# Patient Record
Sex: FEMALE | Race: WHITE | HISPANIC OR LATINO | Employment: FULL TIME | URBAN - METROPOLITAN AREA
[De-identification: names, ages, dates, MRNs, and addresses within clinical notes are randomized per-mention and may not be internally consistent; named-entity substitution may affect disease eponyms.]

---

## 2017-05-05 ENCOUNTER — NON-PROVIDER VISIT (OUTPATIENT)
Dept: URGENT CARE | Facility: PHYSICIAN GROUP | Age: 52
End: 2017-05-05

## 2017-05-05 DIAGNOSIS — Z02.1 PRE-EMPLOYMENT DRUG SCREENING: ICD-10-CM

## 2017-05-05 LAB
AMP AMPHETAMINE: NORMAL
COC COCAINE: NORMAL
INT CON NEG: NEGATIVE
INT CON POS: POSITIVE
MET METHAMPHETAMINES: NORMAL
OPI OPIATES: NORMAL
PCP PHENCYCLIDINE: NORMAL
POC DRUG COMMENT 753798-OCCUPATIONAL HEALTH: NORMAL
THC: NORMAL

## 2017-05-05 PROCEDURE — 80305 DRUG TEST PRSMV DIR OPT OBS: CPT | Performed by: FAMILY MEDICINE

## 2018-09-07 ENCOUNTER — HOSPITAL ENCOUNTER (OUTPATIENT)
Dept: RADIOLOGY | Facility: MEDICAL CENTER | Age: 53
End: 2018-09-07

## 2018-09-10 ENCOUNTER — HOSPITAL ENCOUNTER (OUTPATIENT)
Dept: RADIOLOGY | Facility: MEDICAL CENTER | Age: 53
End: 2018-09-10
Attending: PHYSICIAN ASSISTANT
Payer: COMMERCIAL

## 2018-09-10 DIAGNOSIS — Z12.31 VISIT FOR SCREENING MAMMOGRAM: ICD-10-CM

## 2018-09-10 PROCEDURE — 77067 SCR MAMMO BI INCL CAD: CPT

## 2020-01-06 ENCOUNTER — OFFICE VISIT (OUTPATIENT)
Dept: URGENT CARE | Facility: PHYSICIAN GROUP | Age: 55
End: 2020-01-06
Payer: COMMERCIAL

## 2020-01-06 VITALS
OXYGEN SATURATION: 99 % | SYSTOLIC BLOOD PRESSURE: 120 MMHG | DIASTOLIC BLOOD PRESSURE: 70 MMHG | RESPIRATION RATE: 18 BRPM | HEART RATE: 74 BPM | BODY MASS INDEX: 29.49 KG/M2 | TEMPERATURE: 97.7 F | WEIGHT: 177.2 LBS

## 2020-01-06 DIAGNOSIS — J06.9 VIRAL URI WITH COUGH: ICD-10-CM

## 2020-01-06 PROCEDURE — 99203 OFFICE O/P NEW LOW 30 MIN: CPT | Performed by: NURSE PRACTITIONER

## 2020-01-06 RX ORDER — FLUTICASONE PROPIONATE 50 MCG
1 SPRAY, SUSPENSION (ML) NASAL DAILY
Qty: 16 G | Refills: 0 | Status: SHIPPED | OUTPATIENT
Start: 2020-01-06 | End: 2022-02-17

## 2020-01-06 RX ORDER — LISINOPRIL 2.5 MG/1
2.5 TABLET ORAL DAILY
COMMUNITY

## 2020-01-06 RX ORDER — ALBUTEROL SULFATE 90 UG/1
2 AEROSOL, METERED RESPIRATORY (INHALATION) EVERY 6 HOURS PRN
Qty: 8.5 G | Refills: 0 | Status: SHIPPED | OUTPATIENT
Start: 2020-01-06 | End: 2022-02-17

## 2020-01-06 RX ORDER — PIOGLITAZONEHYDROCHLORIDE 15 MG/1
15 TABLET ORAL DAILY
COMMUNITY
End: 2022-02-17

## 2020-01-06 RX ORDER — AZITHROMYCIN 250 MG/1
TABLET, FILM COATED ORAL
Qty: 6 TAB | Refills: 0 | Status: SHIPPED | OUTPATIENT
Start: 2020-01-06 | End: 2022-02-17

## 2020-01-06 RX ORDER — ATORVASTATIN CALCIUM 40 MG/1
40 TABLET, FILM COATED ORAL NIGHTLY
COMMUNITY

## 2020-01-06 ASSESSMENT — ENCOUNTER SYMPTOMS
HEARTBURN: 0
COUGH: 1
SHORTNESS OF BREATH: 0
DIZZINESS: 0
HEADACHES: 0
VOMITING: 0
SORE THROAT: 1
CHILLS: 0
ABDOMINAL PAIN: 0
FEVER: 0
NAUSEA: 0

## 2020-01-06 NOTE — PROGRESS NOTES
Subjective:      Consuelo Calabrese is a 54 y.o. female who presents with Cough (cough with phlegm x 6 days, chest congestion, chills,headache)            Cough   This is a new problem. Episode onset: 6 days. The problem has been gradually worsening. The cough is non-productive. Associated symptoms include chest pain, nasal congestion, postnasal drip and a sore throat. Pertinent negatives include no chills, fever, headaches, heartburn, rash or shortness of breath. Associated symptoms comments: Patient states she has had a cough with phlegm for 6 days and is now developing chest congestion chills and headache and states that it hurts when she breathes in, states she has now developed laryngitis today. Nothing aggravates the symptoms. Treatments tried: Nyquil. The treatment provided mild relief.       Review of Systems   Constitutional: Negative for chills and fever.   HENT: Positive for postnasal drip and sore throat.    Respiratory: Positive for cough. Negative for shortness of breath.    Cardiovascular: Positive for chest pain.   Gastrointestinal: Negative for abdominal pain, heartburn, nausea and vomiting.   Skin: Negative for itching and rash.   Neurological: Negative for dizziness and headaches.     Past Medical History:   Diagnosis Date   • Diabetes       Past Surgical History:   Procedure Laterality Date   • VAGINAL HYSTERECTOMY SCOPE TOTAL  12/16/2008    Performed by IS[facility]   • BLADDER SLING FEMALE  12/16/2008    Performed by ISAIAH MANTILLA at SURGERY SAME DAY [facility]S   • CYSTOSCOPY  12/16/2008    Performed by ISAIAH MANTILLA at [facility]      Social History     Socioeconomic History   • Marital status: Single     Spouse name: Not on file   • Number of children: Not on file   • Years of education: Not on file   • Highest education level: Not on file   Occupational History   • Not on file   Social Needs   • Financial resource  strain: Not on file   • Food insecurity:     Worry: Not on file     Inability: Not on file   • Transportation needs:     Medical: Not on file     Non-medical: Not on file   Tobacco Use   • Smoking status: Never Smoker   • Smokeless tobacco: Never Used   Substance and Sexual Activity   • Alcohol use: No   • Drug use: No   • Sexual activity: Not on file   Lifestyle   • Physical activity:     Days per week: Not on file     Minutes per session: Not on file   • Stress: Not on file   Relationships   • Social connections:     Talks on phone: Not on file     Gets together: Not on file     Attends Congregation service: Not on file     Active member of club or organization: Not on file     Attends meetings of clubs or organizations: Not on file     Relationship status: Not on file   • Intimate partner violence:     Fear of current or ex partner: Not on file     Emotionally abused: Not on file     Physically abused: Not on file     Forced sexual activity: Not on file   Other Topics Concern   • Not on file   Social History Narrative   • Not on file    Allergies: Sulfa drugs         Objective:     /70 (BP Location: Right arm, Patient Position: Sitting, BP Cuff Size: Adult)   Pulse 74   Temp 36.5 °C (97.7 °F) (Temporal)   Resp 18   Wt 80.4 kg (177 lb 3.2 oz)   SpO2 99%   BMI 29.49 kg/m²      Physical Exam  Vitals signs reviewed.   Constitutional:       Appearance: Normal appearance.   HENT:      Right Ear: Tympanic membrane, ear canal and external ear normal.      Left Ear: Tympanic membrane, ear canal and external ear normal.      Nose: Nose normal.      Mouth/Throat:      Mouth: Mucous membranes are moist.      Comments: Post nasal drip noted  Cardiovascular:      Rate and Rhythm: Normal rate and regular rhythm.      Heart sounds: Normal heart sounds.   Pulmonary:      Effort: Pulmonary effort is normal.      Breath sounds: Normal breath sounds.      Comments: Tight lung sounds throughout  Lymphadenopathy:       Cervical: Cervical adenopathy present.   Neurological:      General: No focal deficit present.      Mental Status: She is alert and oriented to person, place, and time.   Psychiatric:         Mood and Affect: Mood normal.         Behavior: Behavior normal.         Thought Content: Thought content normal.         Judgment: Judgment normal.                 Assessment/Plan:       1. Viral URI with cough  - albuterol 108 (90 Base) MCG/ACT Aero Soln inhalation aerosol; Inhale 2 Puffs by mouth every 6 hours as needed.  Dispense: 8.5 g; Refill: 0  - fluticasone (FLONASE ALLERGY RELIEF) 50 MCG/ACT nasal spray; Spray 1 Spray in nose every day.  Dispense: 16 g; Refill: 0  - azithromycin (ZITHROMAX) 250 MG Tab; Take two tablets on the first day and then one tablet for the next four days  Dispense: 6 Tab; Refill: 0  Contingent antibiotic prescription given to patient to fill upon meeting criteria of guidelines discussed.     Discussed physical examination findings are likely due to viral etiology.  Patient will start antibiotic on Wednesday if symptoms are not better.  Discussed him to Medicare including albuterol inhaler and using fluticasone as well as over-the-counter NSAIDs and Tylenol per 's instructions, increasing fluids, patient may continue to take NyQuil and Mucinex    Supportive care, differential diagnoses, and indications for immediate follow-up discussed with patient.    Pathogenesis of diagnosis discussed including typical length and natural progression. Patient expresses understanding and agrees to plan.    Instructed patient to return to clinic for worsening symptoms or symptoms that persist for 7 to 10 days       Please note that this dictation was created using voice recognition software. I have made every reasonable attempt to correct obvious errors, but I expect that there are errors of grammar and possibly content that I did not discover before finalizing the note.

## 2020-01-06 NOTE — LETTER
January 6, 2020         Patient: Consuelo Calabrese   YOB: 1965   Date of Visit: 1/6/2020           To Whom it May Concern:    Consuelo Calabrese was seen in my clinic on 1/6/2020. She may return to work on 01/09/2019    If you have any questions or concerns, please don't hesitate to call.        Sincerely,           JEFFERSON Britt.  Electronically Signed

## 2020-08-19 ENCOUNTER — APPOINTMENT (OUTPATIENT)
Dept: RADIOLOGY | Facility: IMAGING CENTER | Age: 55
End: 2020-08-19
Attending: PHYSICIAN ASSISTANT
Payer: COMMERCIAL

## 2020-08-19 ENCOUNTER — OFFICE VISIT (OUTPATIENT)
Dept: URGENT CARE | Facility: CLINIC | Age: 55
End: 2020-08-19
Payer: COMMERCIAL

## 2020-08-19 VITALS
HEART RATE: 68 BPM | TEMPERATURE: 98.3 F | DIASTOLIC BLOOD PRESSURE: 68 MMHG | OXYGEN SATURATION: 100 % | RESPIRATION RATE: 16 BRPM | SYSTOLIC BLOOD PRESSURE: 124 MMHG

## 2020-08-19 DIAGNOSIS — S92.302A CLOSED AVULSION FRACTURE OF METATARSAL BONE OF LEFT FOOT, INITIAL ENCOUNTER: ICD-10-CM

## 2020-08-19 DIAGNOSIS — M25.572 ACUTE LEFT ANKLE PAIN: ICD-10-CM

## 2020-08-19 DIAGNOSIS — S92.352A CLOSED NON-PHYSEAL FRACTURE OF FIFTH METATARSAL BONE OF LEFT FOOT, INITIAL ENCOUNTER: ICD-10-CM

## 2020-08-19 PROCEDURE — 99214 OFFICE O/P EST MOD 30 MIN: CPT | Performed by: PHYSICIAN ASSISTANT

## 2020-08-19 PROCEDURE — 73610 X-RAY EXAM OF ANKLE: CPT | Mod: TC,LT | Performed by: PHYSICIAN ASSISTANT

## 2020-08-19 PROCEDURE — 73630 X-RAY EXAM OF FOOT: CPT | Mod: TC,LT | Performed by: PHYSICIAN ASSISTANT

## 2020-08-19 ASSESSMENT — ENCOUNTER SYMPTOMS
CHILLS: 0
COUGH: 0
CONSTIPATION: 0
HEADACHES: 0
MYALGIAS: 0
DIARRHEA: 0
VOMITING: 0
EYE PAIN: 0
SORE THROAT: 0
FEVER: 0
ABDOMINAL PAIN: 0
SHORTNESS OF BREATH: 0
NAUSEA: 0

## 2020-08-19 NOTE — PROGRESS NOTES
Subjective:   Consuelo Calabrese is a 55 y.o. female who presents for Fall (she missed a step this morning)      This is a 55-year-old British-speaking female accompanied by her daughter acting as  who reports a fall around midnight last night where she missed the bottom step in a staircase.  Patient had her left leg extended and when it came down she felt immediate midfoot pain and pain on the lateral aspect of her left foot.  She had cataract corrective surgery this morning and did not think much about her foot however she continued to have pain and limp and they present for evaluation.  She has no other injuries or falls, no history of same.  She is a well-controlled diabetic she reports they have tried nonsteroidal anti-inflammatories and Tiger balm which has not really helped.      Review of Systems   Constitutional: Negative for chills and fever.   HENT: Negative for congestion, ear pain and sore throat.    Eyes: Negative for pain.   Respiratory: Negative for cough and shortness of breath.    Cardiovascular: Negative for chest pain.   Gastrointestinal: Negative for abdominal pain, constipation, diarrhea, nausea and vomiting.   Genitourinary: Negative for dysuria.   Musculoskeletal: Negative for myalgias.   Skin: Negative for rash.   Neurological: Negative for headaches.       Medications:    • albuterol Aers  • ASPIRIN 81 PO  • atorvastatin Tabs  • azithromycin Tabs  • Dulaglutide Sopn  • Empagliflozin Tabs  • fluticasone  • guaiFENesin ER Tb12  • insulin NPH Susp  • insulin regular Soln  • lisinopril Tabs  • metFORMIN Tabs  • pioglitazone Tabs    Allergies: Sulfa drugs    Problem List: Consuelo Calabrese has DKA (diabetic ketoacidoses) ([facility]); Sepsis (HCC); CAP (community acquired pneumonia); Increased anion gap metabolic acidosis; Diabetes mellitus, type 2 (HCC); and Lower urinary tract infectious disease on their problem list.    Surgical History:  Past Surgical History:   Procedure  Laterality Date   • VAGINAL HYSTERECTOMY SCOPE TOTAL  12/16/2008    Performed by ISAIAH MANTILLA at [facility]   • BLADDER SLING FEMALE  12/16/2008    Performed by ISAIAH MANTILLA at [facility]   • CYSTOSCOPY  12/16/2008    Performed by ISAIAH MANTILLA at [facility]       Past Social Hx: Consuelo Calabrese  reports that she has never smoked. She has never used smokeless tobacco. She reports that she does not drink alcohol or use drugs.     Past Family Hx:  Consuelo Calabrese family history is not on file.     Problem list, medications, and allergies reviewed by myself today in Epic.     Objective:     /68 (BP Location: Left arm, Patient Position: Sitting, BP Cuff Size: Adult)   Pulse 68   Temp 36.8 °C (98.3 °F) (Temporal)   Resp 16   SpO2 100%     Physical Exam  Vitals signs reviewed.   Constitutional:       Appearance: Normal appearance.   HENT:      Head: Normocephalic and atraumatic.      Right Ear: External ear normal.      Left Ear: External ear normal.      Nose: Nose normal.      Mouth/Throat:      Mouth: Mucous membranes are moist.   Eyes:      Conjunctiva/sclera: Conjunctivae normal.      Comments: Protective shield over left eye following surgery   Cardiovascular:      Rate and Rhythm: Normal rate.   Pulmonary:      Effort: Pulmonary effort is normal.   Musculoskeletal:      Comments: TTP over fifth metatarsal, no bony tenderness over the bony prominences of the ankle.  Flexion extension of actively and passively of the foot is intact however with pain.  Neurovascularly intact.  Brisk cap refill.   Skin:     General: Skin is warm and dry.      Capillary Refill: Capillary refill takes less than 2 seconds.   Neurological:      Mental Status: She is alert and oriented to person, place, and time.         RADIOLOGY RESULTS   Dx-ankle 3+ Views Left    Result Date: 8/19/2020 8/19/2020 12:20 PM HISTORY/REASON FOR EXAM:   Pain/Deformity Following Trauma. Fall today, pain and swelling TECHNIQUE/EXAM DESCRIPTION AND NUMBER OF VIEWS:  3 views of the LEFT ankle. COMPARISON: None. FINDINGS: Mildly displaced fracture at the fifth metatarsal base, possibly an avulsion injury. The visualized osseous structures are in anatomic alignment.  Ankle mortise is symmetric. The joint spaces are preserved. Bone mineralization may be decreased..     Mildly displaced fracture of the fifth metatarsal base. Prominent soft tissue swelling about the ankle.     Dx-foot-complete 3+ Left    Result Date: 8/19/2020 8/19/2020 12:20 PM HISTORY/REASON FOR EXAM:  Pain/Deformity Following Trauma TECHNIQUE/EXAM DESCRIPTION AND NUMBER OF VIEWS: 3 nonweightbearing views of the LEFT foot. COMPARISON:  None FINDINGS:  Avulsion fracture in the base of the fifth metatarsal extends to the fifth tarsometatarsal joint. There is overlying soft tissue edema. Enthesophytes project from the calcaneus. There are osteophytes projecting from the dorsal talonavicular and navicular cuneiform joints.     Avulsion fracture in the base of the fifth metatarsal.             Assessment/Plan:     Diagnosis and associated orders:     1. Acute left ankle pain  DX-ANKLE 3+ VIEWS LEFT    DX-FOOT-COMPLETE 3+ LEFT   2. Closed non-physeal fracture of fifth metatarsal bone of left foot, initial encounter  REFERRAL TO SPORTS MEDICINE   3. Closed avulsion fracture of metatarsal bone of left foot, initial encounter        Comments/MDM:     • The medical assistant placed the patient in a walking boot.  Due to the pseudo-Corley nature this needs to have repeat imaging done in the next 2 to 3 weeks to ensure that it is healing appropriately.  I offered crutches however they do not want a use crutches as they think it will make her more likely to fall however I instructed them to be weightbearing as tolerated.  I gave them specific dosage instructions for over-the-counter medications on told him to be wary  about using too much Motrin given her likely kidney disease and told her to take 2 and a milligrams of ibuprofen with breakfast lunch and dinner and she may take an additional Tylenol.  Or to stick to Tylenol monotherapy.  I told him to ice and elevate the ankle and follow-up with sports medicine           Differential diagnosis, natural history, supportive care, and indications for immediate follow-up discussed.    Advised the patient to follow-up with the primary care physician for recheck, reevaluation, and consideration of further management.    Please note that this dictation was created using voice recognition software. I have made a reasonable attempt to correct obvious errors, but I expect that there are errors of grammar and possibly content that I did not discover before finalizing the note.    This note was electronically signed by Vern Mims PA-C

## 2020-08-19 NOTE — LETTER
August 19, 2020    To Whom It May Concern:         This is confirmation that Consuelo Calabrese attended her scheduled appointment with Vern Mims P.A.-C. on 8/19/20.  This patient needs to remain in a walking boot until cleared by a specialist.  She may be able to work starting on August 24 but I told her if standing for prolonged periods causes pain she needs to remain off of work.         If you have any questions please do not hesitate to call me at the phone number listed below.    Sincerely,          Vern Mims P.A.-C.  [phone]

## 2020-08-30 SDOH — HEALTH STABILITY: PHYSICAL HEALTH: ON AVERAGE, HOW MANY DAYS PER WEEK DO YOU ENGAGE IN MODERATE TO STRENUOUS EXERCISE (LIKE A BRISK WALK)?: 0 DAYS

## 2020-08-30 SDOH — ECONOMIC STABILITY: HOUSING INSECURITY: IN THE LAST 12 MONTHS, HOW MANY PLACES HAVE YOU LIVED?: 1

## 2020-08-30 SDOH — ECONOMIC STABILITY: TRANSPORTATION INSECURITY
IN THE PAST 12 MONTHS, HAS LACK OF RELIABLE TRANSPORTATION KEPT YOU FROM MEDICAL APPOINTMENTS, MEETINGS, WORK OR FROM GETTING THINGS NEEDED FOR DAILY LIVING?: NO

## 2020-08-30 SDOH — ECONOMIC STABILITY: INCOME INSECURITY: IN THE LAST 12 MONTHS, WAS THERE A TIME WHEN YOU WERE NOT ABLE TO PAY THE MORTGAGE OR RENT ON TIME?: NO

## 2020-08-30 SDOH — ECONOMIC STABILITY: TRANSPORTATION INSECURITY
IN THE PAST 12 MONTHS, HAS THE LACK OF TRANSPORTATION KEPT YOU FROM MEDICAL APPOINTMENTS OR FROM GETTING MEDICATIONS?: NO

## 2020-08-30 SDOH — ECONOMIC STABILITY: HOUSING INSECURITY
IN THE LAST 12 MONTHS, WAS THERE A TIME WHEN YOU DID NOT HAVE A STEADY PLACE TO SLEEP OR SLEPT IN A SHELTER (INCLUDING NOW)?: NO

## 2020-08-30 SDOH — HEALTH STABILITY: MENTAL HEALTH
STRESS IS WHEN SOMEONE FEELS TENSE, NERVOUS, ANXIOUS, OR CAN'T SLEEP AT NIGHT BECAUSE THEIR MIND IS TROUBLED. HOW STRESSED ARE YOU?: NOT AT ALL

## 2020-08-30 SDOH — HEALTH STABILITY: PHYSICAL HEALTH: ON AVERAGE, HOW MANY MINUTES DO YOU ENGAGE IN EXERCISE AT THIS LEVEL?: 0 MINUTES

## 2020-08-30 ASSESSMENT — SOCIAL DETERMINANTS OF HEALTH (SDOH)
HOW OFTEN DO YOU GET TOGETHER WITH FRIENDS OR RELATIVES?: ONCE A WEEK
HOW MANY DRINKS CONTAINING ALCOHOL DO YOU HAVE ON A TYPICAL DAY WHEN YOU ARE DRINKING: DECLINE
HOW OFTEN DO YOU HAVE SIX OR MORE DRINKS ON ONE OCCASION: NEVER
WITHIN THE PAST 12 MONTHS, YOU WORRIED THAT YOUR FOOD WOULD RUN OUT BEFORE YOU GOT THE MONEY TO BUY MORE: NEVER TRUE
DO YOU BELONG TO ANY CLUBS OR ORGANIZATIONS SUCH AS CHURCH GROUPS UNIONS, FRATERNAL OR ATHLETIC GROUPS, OR SCHOOL GROUPS?: YES
IN A TYPICAL WEEK, HOW MANY TIMES DO YOU TALK ON THE PHONE WITH FAMILY, FRIENDS, OR NEIGHBORS?: MORE THAN THREE TIMES A WEEK
HOW OFTEN DO YOU ATTEND CHURCH OR RELIGIOUS SERVICES?: MORE THAN 4 TIMES PER YEAR
HOW OFTEN DO YOU ATTENT MEETINGS OF THE CLUB OR ORGANIZATION YOU BELONG TO?: MORE THAN 4 TIMES PER YEAR
WITHIN THE PAST 12 MONTHS, THE FOOD YOU BOUGHT JUST DIDN'T LAST AND YOU DIDN'T HAVE MONEY TO GET MORE: NEVER TRUE
HOW HARD IS IT FOR YOU TO PAY FOR THE VERY BASICS LIKE FOOD, HOUSING, MEDICAL CARE, AND HEATING?: SOMEWHAT HARD
HOW OFTEN DO YOU HAVE A DRINK CONTAINING ALCOHOL: NEVER

## 2020-08-31 ENCOUNTER — OFFICE VISIT (OUTPATIENT)
Dept: MEDICAL GROUP | Facility: CLINIC | Age: 55
End: 2020-08-31
Payer: COMMERCIAL

## 2020-08-31 VITALS
OXYGEN SATURATION: 96 % | RESPIRATION RATE: 18 BRPM | SYSTOLIC BLOOD PRESSURE: 132 MMHG | DIASTOLIC BLOOD PRESSURE: 80 MMHG | TEMPERATURE: 98.4 F | HEIGHT: 65 IN | HEART RATE: 86 BPM | WEIGHT: 177.2 LBS | BODY MASS INDEX: 29.52 KG/M2

## 2020-08-31 DIAGNOSIS — S92.353A: ICD-10-CM

## 2020-08-31 PROCEDURE — 28470 CLTX METATARSAL FX WO MNP EA: CPT | Mod: LT | Performed by: FAMILY MEDICINE

## 2020-08-31 ASSESSMENT — ENCOUNTER SYMPTOMS
NAUSEA: 0
CHILLS: 0
VOMITING: 0
DIZZINESS: 0
SHORTNESS OF BREATH: 0
FEVER: 0

## 2020-08-31 NOTE — LETTER
August 31, 2020         Patient: Consuelo Calabrese   YOB: 1965   Date of Visit: 8/31/2020           To Whom it May Concern:    Consuelo Calabrese was seen in my clinic on 8/31/2020. She may return to work, but she CANNOT stand or walk unless for commuting and she needs to wear her orthopedic boot at all times.  She will likely require some sort of restriction for 8 to 10 weeks.    If you have any questions or concerns, please don't hesitate to call.        Sincerely,           Chidi Connolly M.D.  Electronically Signed

## 2020-08-31 NOTE — PROGRESS NOTES
Subjective:      Consuelo Calabrese is a 55 y.o. female who presents with Foot Injury (Referral from UC/ L foot injury )     Referred by Vern Mims PA-C for evaluation of LEFT foot pain    HPI   LEFT foot pain  Date of injury, August 18, 2020  Mechanism of injury, inversion while coming downstairs after inadvertently missing a step due to pending eye surgery  No notable pop at the time of injury  Pain is predominantly along the LEFT lateral foot and plantar aspect of the foot  Burning pain  Some radiation to the despite the distal aspect of the LEFT foot  She does feel somewhat more protected in CAM Walker boot, but it is difficult for her to ambulate with  POSITIVE night symptoms  Swelling has been fluctuating  Naproxen and Tylenol for pain which helps some  There is positive prior history of foot fracture, unclear which foot it was    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   Respiratory: Negative for shortness of breath.    Cardiovascular: Negative for chest pain.   Gastrointestinal: Negative for nausea and vomiting.   Neurological: Negative for dizziness.     PMH:  has a past medical history of Diabetes.  MEDS:   Current Outpatient Medications:   •  Empagliflozin (JARDIANCE) 25 MG Tab, Take  by mouth., Disp: , Rfl:   •  atorvastatin (LIPITOR) 40 MG Tab, Take 40 mg by mouth every evening., Disp: , Rfl:   •  ASPIRIN 81 PO, Take  by mouth., Disp: , Rfl:   •  lisinopril (PRINIVIL) 2.5 MG Tab, Take 2.5 mg by mouth every day., Disp: , Rfl:   •  pioglitazone (ACTOS) 15 MG Tab, Take 15 mg by mouth every day., Disp: , Rfl:   •  Dulaglutide (TRULICITY) 1.5 MG/0.5ML Solution Pen-injector, Inject  as instructed., Disp: , Rfl:   •  albuterol 108 (90 Base) MCG/ACT Aero Soln inhalation aerosol, Inhale 2 Puffs by mouth every 6 hours as needed., Disp: 8.5 g, Rfl: 0  •  fluticasone (FLONASE ALLERGY RELIEF) 50 MCG/ACT  "nasal spray, Spray 1 Spray in nose every day., Disp: 16 g, Rfl: 0  •  azithromycin (ZITHROMAX) 250 MG Tab, Take two tablets on the first day and then one tablet for the next four days, Disp: 6 Tab, Rfl: 0  •  insulin NPH (HUMULIN,NOVOLIN) 100 UNIT/ML SUSP, Inject 20 Units as instructed 2 Times a Day., Disp: 10 mL, Rfl: 1  •  insulin regular (HUMULIN R) 100 Unit/mL SOLN, Inject 0-6 Units as instructed 3 times a day, with meals. Blood sugar before meals TID  >150: 2units with food  200-250: 4 units with food 250-300: 6 units with food >300: 8 units with food and call your doctor, Disp: 10 mL, Rfl: 1  •  metformin (GLUCOPHAGE) 500 MG TABS, Take 1 Tab by mouth 2 times a day, with meals., Disp: 60 Tab, Rfl: 1  •  guaifenesin LA (MUCINEX) 600 MG TB12, Take 1 Tab by mouth every 12 hours as needed for Cough. (Patient not taking: Reported on 1/6/2020), Disp: 28 Tab, Rfl: 0  ALLERGIES:   Allergies   Allergen Reactions   • Sulfa Drugs Anaphylaxis     SURGHX:   Past Surgical History:   Procedure Laterality Date   • VAGINAL HYSTERECTOMY SCOPE TOTAL  12/16/2008    Performed by ISAIAH MANTILLA [facility]   • BLADDER SLING FEMALE  12/16/2008    Performed by ISAIAH MANTILLA at [facility]   • CYSTOSCOPY  12/16/2008    Performed by ISAIAH MANTILLA at [facility]     SOCHX:  reports that she has never smoked. She has never used smokeless tobacco. She reports that she does not drink alcohol or use drugs.  FH: Family history was reviewed, no pertinent findings to report     Objective:     /80 (BP Location: Left arm, Patient Position: Sitting, BP Cuff Size: Adult)   Pulse 86   Temp 36.9 °C (98.4 °F) (Temporal)   Resp 18   Ht 1.651 m (5' 5\")   Wt 80.4 kg (177 lb 3.2 oz)   SpO2 96%   BMI 29.49 kg/m²      Physical Exam       RIGHT ANKLE:  There is NO swelling noted at the ankle  Range of motion intact with dorsiflexion and plantarflexion, inversion and " eversion  There is NO tenderness of the ATFL, CF or PTF ligament  There is NO tenderness of the lateral malleolus or medial malleolus  Anterior drawer testing is NEGATIVE  Talar tilt testing is NEGATIVE  The foot and ankle is otherwise neurovascularly intact    RIGHT FOOT:  There is NO swelling noted at the foot  There is NO tenderness at the base of the fifth metatarsal, cuboid, or tarsal navicular  There is NO pain with metatarsal squeeze test    LEFT ANKLE:  There is MILD swelling noted at the LATERAL ankle  Range of motion intact with dorsiflexion and plantarflexion, inversion and eversion  There is NO tenderness of the ATFL, CF or PTF ligament  There is NO tenderness of the lateral malleolus or medial malleolus  Anterior drawer testing is NEGATIVE  Talar tilt testing is NEGATIVE  The foot and ankle is otherwise neurovascularly intact    LEFT FOOT:  There is POSITIVE swelling with ecchymosis noted at the foot  There is POSITIVE tenderness at the base of the fifth metatarsal, with minimal tenderness of the cuboid, and NO tenderness of the tarsal navicular  There is NO pain with metatarsal squeeze test    NEUTRAL stance  Able to ambulate with ANTALGIC gait with CAM Walker boot and cane for ambulation     Assessment/Plan:        1. Closed displaced fracture of fifth metatarsal bone, initial encounter       Date of injury, August 18, 2020  Mechanism of injury, inversion while coming downstairs after inadvertently missing a step due to pending eye surgery    Fracture stabilized in short cam walker boot at urgent care  The plan is to continue fracture stabilization for a total of 6 weeks from the date of injury (until on or after September 29, 2020)    She is provided with a work note that she cannot walk and she should use her cam walker boot until reevaluation in 2 weeks  She did bring some disability paperwork which we will gladly complete for her    Return in about 2 weeks (around 9/14/2020).  For fracture  check        8/19/2020 12:20 PM     HISTORY/REASON FOR EXAM:  Pain/Deformity Following Trauma        TECHNIQUE/EXAM DESCRIPTION AND NUMBER OF VIEWS:  3 nonweightbearing views of the LEFT foot.     COMPARISON:  None     FINDINGS:  Avulsion fracture in the base of the fifth metatarsal extends to the fifth tarsometatarsal joint. There is overlying soft tissue edema. Enthesophytes project from the calcaneus. There are osteophytes projecting from the dorsal talonavicular   and navicular cuneiform joints.     IMPRESSION:     Avulsion fracture in the base of the fifth metatarsal.            8/19/2020 12:20 PM     HISTORY/REASON FOR EXAM:  Pain/Deformity Following Trauma.  Fall today, pain and swelling     TECHNIQUE/EXAM DESCRIPTION AND NUMBER OF VIEWS:  3 views of the LEFT ankle.     COMPARISON: None.     FINDINGS:     Mildly displaced fracture at the fifth metatarsal base, possibly an avulsion injury.  The visualized osseous structures are in anatomic alignment.  Ankle mortise is symmetric.  The joint spaces are preserved.  Bone mineralization may be decreased..        IMPRESSION:     Mildly displaced fracture of the fifth metatarsal base.     Prominent soft tissue swelling about the ankle.     Thank you Vern Mims PA-C for allowing me to participate in care of your patient.      Disability forms completed and will be scanned.

## 2020-09-14 ENCOUNTER — OFFICE VISIT (OUTPATIENT)
Dept: MEDICAL GROUP | Facility: CLINIC | Age: 55
End: 2020-09-14
Payer: COMMERCIAL

## 2020-09-14 VITALS
DIASTOLIC BLOOD PRESSURE: 76 MMHG | WEIGHT: 177.2 LBS | SYSTOLIC BLOOD PRESSURE: 118 MMHG | RESPIRATION RATE: 16 BRPM | BODY MASS INDEX: 29.52 KG/M2 | HEART RATE: 82 BPM | TEMPERATURE: 98.6 F | HEIGHT: 65 IN | OXYGEN SATURATION: 97 %

## 2020-09-14 DIAGNOSIS — S92.353D: ICD-10-CM

## 2020-09-14 PROCEDURE — 99024 POSTOP FOLLOW-UP VISIT: CPT | Performed by: FAMILY MEDICINE

## 2020-09-14 NOTE — PROGRESS NOTES
"Subjective:      Consuelo Calabrese is a 55 y.o. 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 [employer] items  She likes to walk     Objective:     /76 (BP Location: Left arm, Patient Position: Sitting, BP Cuff Size: Adult)   Pulse 82   Temp 37 °C (98.6 °F) (Temporal)   Resp 16   Ht 1.651 m (5' 5\")   Wt 80.4 kg (177 lb 3.2 oz)   SpO2 97%   BMI 29.49 kg/m²     Physical Exam    LEFT FOOT:  There is MINIMAL swelling noted at the foot  There is MINIMAL tenderness at the base of the fifth metatarsal, and no tenderness of the cuboid, and NO tenderness of the tarsal navicular  There is NO pain with metatarsal squeeze test    NEUTRAL stance  Able to ambulate with NORMAL gait with CAM Walker boot     Assessment/Plan:        1. Closed displaced fracture of fifth metatarsal bone with routine healing, subsequent encounter       Date of injury, August 18, 2020  Mechanism of injury, inversion while coming downstairs after inadvertently missing a step due to pending eye surgery    Fracture stabilized in short cam walker boot at urgent care  Continue fracture stabilization for a total of 6 weeks from the date of injury (until on or after September 29, 2020)    Return in about 3 weeks (around 10/5/2020).  To see how she is doing OUT of her cam walker boot        8/19/2020 12:20 PM     HISTORY/REASON FOR EXAM:  Pain/Deformity Following Trauma        TECHNIQUE/EXAM DESCRIPTION AND NUMBER OF VIEWS:  3 nonweightbearing views of the LEFT foot.     COMPARISON:  None     FINDINGS:  Avulsion fracture in the base of the fifth metatarsal " extends to the fifth tarsometatarsal joint. There is overlying soft tissue edema. Enthesophytes project from the calcaneus. There are osteophytes projecting from the dorsal talonavicular   and navicular cuneiform joints.     IMPRESSION:     Avulsion fracture in the base of the fifth metatarsal.          8/19/2020 12:20 PM     HISTORY/REASON FOR EXAM:  Pain/Deformity Following Trauma.  Fall today, pain and swelling     TECHNIQUE/EXAM DESCRIPTION AND NUMBER OF VIEWS:  3 views of the LEFT ankle.     COMPARISON: None.     FINDINGS:     Mildly displaced fracture at the fifth metatarsal base, possibly an avulsion injury.  The visualized osseous structures are in anatomic alignment.  Ankle mortise is symmetric.  The joint spaces are preserved.  Bone mineralization may be decreased..        IMPRESSION:     Mildly displaced fracture of the fifth metatarsal base.     Prominent soft tissue swelling about the ankle.     Thank you Vern Mims PA-C for allowing me to participate in care of your patient.

## 2020-10-05 ENCOUNTER — OFFICE VISIT (OUTPATIENT)
Dept: MEDICAL GROUP | Facility: CLINIC | Age: 55
End: 2020-10-05
Payer: COMMERCIAL

## 2020-10-05 ENCOUNTER — APPOINTMENT (OUTPATIENT)
Dept: RADIOLOGY | Facility: IMAGING CENTER | Age: 55
End: 2020-10-05
Attending: FAMILY MEDICINE
Payer: COMMERCIAL

## 2020-10-05 VITALS
DIASTOLIC BLOOD PRESSURE: 80 MMHG | OXYGEN SATURATION: 95 % | HEART RATE: 86 BPM | BODY MASS INDEX: 29.52 KG/M2 | RESPIRATION RATE: 18 BRPM | SYSTOLIC BLOOD PRESSURE: 122 MMHG | TEMPERATURE: 98.3 F | WEIGHT: 177.2 LBS | HEIGHT: 65 IN

## 2020-10-05 DIAGNOSIS — S92.353D: ICD-10-CM

## 2020-10-05 PROCEDURE — 99024 POSTOP FOLLOW-UP VISIT: CPT | Performed by: FAMILY MEDICINE

## 2020-10-05 PROCEDURE — 73630 X-RAY EXAM OF FOOT: CPT | Mod: TC,LT | Performed by: FAMILY MEDICINE

## 2020-10-05 NOTE — PROGRESS NOTES
"Subjective:      Consuelo Calabrese is a 55 y.o. 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 [employer] items  She likes to walk     Objective:     /80 (BP Location: Left arm, Patient Position: Sitting, BP Cuff Size: Adult)   Pulse 86   Temp 36.8 °C (98.3 °F) (Temporal)   Resp 18   Ht 1.651 m (5' 5\")   Wt 80.4 kg (177 lb 3.2 oz)   SpO2 95%   BMI 29.49 kg/m²       Physical Exam    LEFT FOOT:  There is NO swelling noted at the foot  There is MINIMAL tenderness at the base of the fifth metatarsal, and no tenderness of the cuboid, and NO tenderness of the tarsal navicular  There is NO pain with metatarsal squeeze test    NEUTRAL stance  Able to ambulate with NORMAL gait with CAM Walker boot     Assessment/Plan:      1. Closed displaced fracture of fifth metatarsal bone with routine healing, subsequent encounter  DX-FOOT-COMPLETE 3+ LEFT     Date of injury, August 18, 2020  Mechanism of injury, inversion while coming downstairs after inadvertently missing a step due to pending eye surgery    Fracture stabilized in short cam walker boot at urgent care  Continue fracture stabilization for a total of 6 weeks from the date of injury (until on or after September 29, 2020)    Return in about 1 week (around 10/12/2020).  To see how she is doing weaning out of her cam walker boot  The plan is to get her out of the boot at that visit    10/5/2020 1:19 PM     HISTORY/REASON FOR EXAM:  Pain/Deformity Following Trauma; continued fracture site pain, verify healing and stability     TECHNIQUE/EXAM DESCRIPTION AND NUMBER OF " VIEWS:  3 views of the LEFT foot.     COMPARISON:  8/19/2020     FINDINGS:  Fracture again seen at the base of the 5th metatarsal involving the articular surface.  Alignment is similar to prior exam.  Margins are ill-defined.  No bridging callus demonstrated.  Diffuse osteopenia.  Degenerative change of the midfoot.  Calcaneal enthesophytes present.     IMPRESSION:     Subacute intra-articular fracture of the base of LEFT 5th metatarsal without evidence for bridging callus.  Alignment is unchanged.              8/19/2020 12:20 PM     HISTORY/REASON FOR EXAM:  Pain/Deformity Following Trauma        TECHNIQUE/EXAM DESCRIPTION AND NUMBER OF VIEWS:  3 nonweightbearing views of the LEFT foot.     COMPARISON:  None     FINDINGS:  Avulsion fracture in the base of the fifth metatarsal extends to the fifth tarsometatarsal joint. There is overlying soft tissue edema. Enthesophytes project from the calcaneus. There are osteophytes projecting from the dorsal talonavicular   and navicular cuneiform joints.     IMPRESSION:     Avulsion fracture in the base of the fifth metatarsal.          8/19/2020 12:20 PM     HISTORY/REASON FOR EXAM:  Pain/Deformity Following Trauma.  Fall today, pain and swelling     TECHNIQUE/EXAM DESCRIPTION AND NUMBER OF VIEWS:  3 views of the LEFT ankle.     COMPARISON: None.     FINDINGS:     Mildly displaced fracture at the fifth metatarsal base, possibly an avulsion injury.  The visualized osseous structures are in anatomic alignment.  Ankle mortise is symmetric.  The joint spaces are preserved.  Bone mineralization may be decreased..        IMPRESSION:     Mildly displaced fracture of the fifth metatarsal base.     Prominent soft tissue swelling about the ankle.     Thank you Vern Mims PA-C for allowing me to participate in care of your patient.

## 2020-10-12 ENCOUNTER — OFFICE VISIT (OUTPATIENT)
Dept: MEDICAL GROUP | Facility: CLINIC | Age: 55
End: 2020-10-12
Payer: COMMERCIAL

## 2020-10-12 ENCOUNTER — APPOINTMENT (OUTPATIENT)
Dept: RADIOLOGY | Facility: IMAGING CENTER | Age: 55
End: 2020-10-12
Attending: FAMILY MEDICINE
Payer: COMMERCIAL

## 2020-10-12 VITALS
DIASTOLIC BLOOD PRESSURE: 80 MMHG | HEIGHT: 65 IN | TEMPERATURE: 98.7 F | BODY MASS INDEX: 29.52 KG/M2 | SYSTOLIC BLOOD PRESSURE: 118 MMHG | RESPIRATION RATE: 16 BRPM | WEIGHT: 177.2 LBS | OXYGEN SATURATION: 97 % | HEART RATE: 76 BPM

## 2020-10-12 DIAGNOSIS — S92.353D: ICD-10-CM

## 2020-10-12 PROCEDURE — 99024 POSTOP FOLLOW-UP VISIT: CPT | Performed by: FAMILY MEDICINE

## 2020-10-12 PROCEDURE — 73630 X-RAY EXAM OF FOOT: CPT | Mod: TC,LT | Performed by: FAMILY MEDICINE

## 2020-10-12 NOTE — PROGRESS NOTES
"Subjective:      Consuelo Calabrese is a 55 y.o. female who presents with Foot Injury (Referral from UC/ L foot injury )     Referred by Vern Mims PA-C for evaluation of LEFT foot pain    HPI   LEFT foot pain  Date of injury, August 18, 2020  Mechanism of injury, inversion while coming downstairs after inadvertently missing a step due to pending eye surgery  Unfortunately, she sustained a repeat injury the day after her last visit (on August 6, 2020), she has had significant fracture site pain since then.    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 [employer] items  She likes to walk     Objective:     /80 (BP Location: Right arm, Patient Position: Sitting, BP Cuff Size: Adult)   Pulse 76   Temp 37.1 °C (98.7 °F) (Temporal)   Resp 16   Ht 1.651 m (5' 5\")   Wt 80.4 kg (177 lb 3.2 oz)   SpO2 97%   BMI 29.49 kg/m²     Physical Exam    LEFT FOOT:  There is NO swelling noted at the foot  There is POSITIVE tenderness at the base of the fifth metatarsal, and no tenderness of the cuboid, and NO tenderness of the tarsal navicular  There is NO pain with metatarsal squeeze test    NEUTRAL stance  Able to ambulate with mildly antalgic gait with CAM Walker boot     Assessment/Plan:      1. Closed displaced fracture of fifth metatarsal bone with routine healing, subsequent encounter  DX-FOOT-COMPLETE 3+ LEFT    REFERRAL TO ORTHOPEDICS     Date of injury, August 18, 2020  Mechanism of injury, inversion while coming downstairs after inadvertently missing a step due to pending eye surgery    Fracture stabilized in short cam walker boot at urgent care   The original plan was to continue fracture stabilization for a total of 6 weeks from the date of injury (until on or after September 29, 2020)    Unfortunately, she sustained a REPEAT injury on October 6, 2020  There is no much difference in her repeat films    Referral for orthopedic foot and ankle evaluation since her initial injury was 8 weeks ago and she now " sustained a REPEAT injury causing recurrence/worsening of her pain      TECHNIQUE/EXAM DESCRIPTION AND NUMBER OF VIEWS:  3 views of the LEFT foot.     COMPARISON:  10/5/2020     FINDINGS:     There is no focal soft tissue swelling.     The subacute intra-articular fracture at the base of the left 5th metatarsal is without significant interval change.     The alignment is maintained.     There are no new fractures.     The remainder of the foot is unchanged.     IMPRESSION:     1.  There is no change in the appearance of the subacute intra-articular fracture at the base of the left 5th metatarsal.      10/5/2020 1:19 PM     HISTORY/REASON FOR EXAM:  Pain/Deformity Following Trauma; continued fracture site pain, verify healing and stability     TECHNIQUE/EXAM DESCRIPTION AND NUMBER OF VIEWS:  3 views of the LEFT foot.     COMPARISON:  8/19/2020     FINDINGS:  Fracture again seen at the base of the 5th metatarsal involving the articular surface.  Alignment is similar to prior exam.  Margins are ill-defined.  No bridging callus demonstrated.  Diffuse osteopenia.  Degenerative change of the midfoot.  Calcaneal enthesophytes present.     IMPRESSION:     Subacute intra-articular fracture of the base of LEFT 5th metatarsal without evidence for bridging callus.  Alignment is unchanged.              8/19/2020 12:20 PM     HISTORY/REASON FOR EXAM:  Pain/Deformity Following Trauma        TECHNIQUE/EXAM DESCRIPTION AND NUMBER OF VIEWS:  3 nonweightbearing views of the LEFT foot.     COMPARISON:  None     FINDINGS:  Avulsion fracture in the base of the fifth metatarsal extends to the fifth tarsometatarsal joint. There is overlying soft tissue edema. Enthesophytes project from the calcaneus. There are osteophytes projecting from the dorsal talonavicular   and navicular cuneiform joints.     IMPRESSION:     Avulsion fracture in the base of the fifth metatarsal.          8/19/2020 12:20 PM     HISTORY/REASON FOR EXAM:   Pain/Deformity Following Trauma.  Fall today, pain and swelling     TECHNIQUE/EXAM DESCRIPTION AND NUMBER OF VIEWS:  3 views of the LEFT ankle.     COMPARISON: None.     FINDINGS:     Mildly displaced fracture at the fifth metatarsal base, possibly an avulsion injury.  The visualized osseous structures are in anatomic alignment.  Ankle mortise is symmetric.  The joint spaces are preserved.  Bone mineralization may be decreased..        IMPRESSION:     Mildly displaced fracture of the fifth metatarsal base.      Thank you Vern Mims PA-C for allowing me to participate in care of your patient.

## 2020-10-20 ENCOUNTER — OFFICE VISIT (OUTPATIENT)
Dept: URGENT CARE | Facility: PHYSICIAN GROUP | Age: 55
End: 2020-10-20
Payer: COMMERCIAL

## 2020-10-20 VITALS
HEIGHT: 65 IN | SYSTOLIC BLOOD PRESSURE: 122 MMHG | WEIGHT: 177 LBS | TEMPERATURE: 98.4 F | DIASTOLIC BLOOD PRESSURE: 84 MMHG | RESPIRATION RATE: 18 BRPM | BODY MASS INDEX: 29.49 KG/M2 | OXYGEN SATURATION: 97 % | HEART RATE: 88 BPM

## 2020-10-20 DIAGNOSIS — L29.9 PRURITUS: ICD-10-CM

## 2020-10-20 DIAGNOSIS — H93.8X1 EAR SWELLING, RIGHT: ICD-10-CM

## 2020-10-20 DIAGNOSIS — R21 RASH AND NONSPECIFIC SKIN ERUPTION: ICD-10-CM

## 2020-10-20 DIAGNOSIS — H60.11 CELLULITIS OF AURICLE OF RIGHT EAR: ICD-10-CM

## 2020-10-20 PROCEDURE — 99214 OFFICE O/P EST MOD 30 MIN: CPT | Performed by: NURSE PRACTITIONER

## 2020-10-20 RX ORDER — DIPHENHYDRAMINE HCL 50 MG
50 CAPSULE ORAL EVERY 6 HOURS PRN
Qty: 30 CAP | Refills: 0 | Status: SHIPPED | OUTPATIENT
Start: 2020-10-20

## 2020-10-20 RX ORDER — AMOXICILLIN 500 MG/1
500 CAPSULE ORAL 3 TIMES DAILY
Qty: 15 CAP | Refills: 0 | Status: SHIPPED | OUTPATIENT
Start: 2020-10-20 | End: 2020-10-25

## 2020-10-20 ASSESSMENT — ENCOUNTER SYMPTOMS
CARDIOVASCULAR NEGATIVE: 1
CONSTITUTIONAL NEGATIVE: 1
FEVER: 0
RESPIRATORY NEGATIVE: 1

## 2020-10-20 ASSESSMENT — VISUAL ACUITY: OU: 1

## 2020-10-20 NOTE — PROGRESS NOTES
Subjective:     Consuelo Calabrese is a 55 y.o. female who presents for Ear Pain (R ear pain, swelling, burning, pt states she woke up this morning with it )       Otalgia   There is pain in the right ear. This is a new problem. The problem has been gradually worsening. The pain is mild. Associated symptoms include a rash.      Patient reports that today she started to experience redness, swelling, and warmth of her right external ear.  Also reports itchiness.  Also noticing a small rash on both of her arms which is also a little red and itchy.  Had no symptoms yesterday.  Reports she was recently in contact with a cat but reports she has been with the same cat before and has not had problems.  She also reports that she was handling a tomato plant recently.  Prior therapy: None.  Denies fever or other symptoms.    Patient was screened prior to rooming and denied COVID-19 diagnosis or contact with a person who has been diagnosed or is suspected to have COVID-19. During this visit, appropriate PPE was worn, hand hygiene was performed, and the patient and any visitors were masked.    PMH:  has a past medical history of Diabetes.    MEDS:   Current Outpatient Medications:   •  diphenhydrAMINE (BENADRYL) 50 MG Cap, Take 1 Cap by mouth every 6 hours as needed for Itching or Rash., Disp: 30 Cap, Rfl: 0  •  amoxicillin (AMOXIL) 500 MG Cap, Take 1 Cap by mouth 3 times a day for 5 days., Disp: 15 Cap, Rfl: 0  •  Empagliflozin (JARDIANCE) 25 MG Tab, Take  by mouth., Disp: , Rfl:   •  atorvastatin (LIPITOR) 40 MG Tab, Take 40 mg by mouth every evening., Disp: , Rfl:   •  ASPIRIN 81 PO, Take  by mouth., Disp: , Rfl:   •  lisinopril (PRINIVIL) 2.5 MG Tab, Take 2.5 mg by mouth every day., Disp: , Rfl:   •  Dulaglutide (TRULICITY) 1.5 MG/0.5ML Solution Pen-injector, Inject  as instructed., Disp: , Rfl:   •  metformin (GLUCOPHAGE) 500 MG TABS, Take 1 Tab by mouth 2 times a day, with meals., Disp: 60 Tab, Rfl: 1  •  pioglitazone  (ACTOS) 15 MG Tab, Take 15 mg by mouth every day., Disp: , Rfl:   •  albuterol 108 (90 Base) MCG/ACT Aero Soln inhalation aerosol, Inhale 2 Puffs by mouth every 6 hours as needed., Disp: 8.5 g, Rfl: 0  •  fluticasone (FLONASE ALLERGY RELIEF) 50 MCG/ACT nasal spray, Spray 1 Spray in nose every day., Disp: 16 g, Rfl: 0  •  azithromycin (ZITHROMAX) 250 MG Tab, Take two tablets on the first day and then one tablet for the next four days, Disp: 6 Tab, Rfl: 0  •  insulin NPH (HUMULIN,NOVOLIN) 100 UNIT/ML SUSP, Inject 20 Units as instructed 2 Times a Day., Disp: 10 mL, Rfl: 1  •  insulin regular (HUMULIN R) 100 Unit/mL SOLN, Inject 0-6 Units as instructed 3 times a day, with meals. Blood sugar before meals TID  >150: 2units with food  200-250: 4 units with food 250-300: 6 units with food >300: 8 units with food and call your doctor, Disp: 10 mL, Rfl: 1  •  guaifenesin LA (MUCINEX) 600 MG TB12, Take 1 Tab by mouth every 12 hours as needed for Cough. (Patient not taking: Reported on 1/6/2020), Disp: 28 Tab, Rfl: 0    ALLERGIES:   Allergies   Allergen Reactions   • Sulfa Drugs Anaphylaxis     SURGHX:   Past Surgical History:   Procedure Laterality Date   • VAGINAL HYSTERECTOMY SCOPE TOTAL  12/16/2008    [facility]   • BLADDER SLING FEMALE  12/16/2008    Performed by ISAIAH MANTILLA at SURGERY SAME DAY [facility] ORS   • CYSTOSCOPY  12/16/2008    Performed by ISAIAH MANTILLA at SURGERY SAME DAY [facility] ORS     SOCHX:  reports that she has never smoked. She has never used smokeless tobacco. She reports that she does not drink alcohol or use drugs.     FH: Reviewed with patient, not pertinent to this visit.    Review of Systems   Constitutional: Negative.  Negative for fever and malaise/fatigue.   HENT: Positive for ear pain.    Respiratory: Negative.    Cardiovascular: Negative.    Skin: Positive for itching and rash.   All other systems reviewed and are  "negative.    Additional details per HPI.      Objective:     /84 (BP Location: Left arm, Patient Position: Sitting, BP Cuff Size: Adult)   Pulse 88   Temp 36.9 °C (98.4 °F) (Temporal)   Resp 18   Ht 1.651 m (5' 5\")   Wt 80.3 kg (177 lb)   SpO2 97%   BMI 29.45 kg/m²     Physical Exam  Vitals signs reviewed.   Constitutional:       General: She is not in acute distress.     Appearance: She is well-developed. She is not ill-appearing or toxic-appearing.   HENT:      Head: Normocephalic.      Right Ear: Tympanic membrane and ear canal normal. Tympanic membrane is not perforated, erythematous or bulging.      Left Ear: External ear normal.      Ears:      Comments: Erythema, edema, warmth of right auricle     Nose: Nose normal.   Eyes:      General: Vision grossly intact.      Extraocular Movements: Extraocular movements intact.      Conjunctiva/sclera: Conjunctivae normal.      Pupils: Pupils are equal, round, and reactive to light.   Neck:      Musculoskeletal: Normal range of motion.   Cardiovascular:      Rate and Rhythm: Normal rate.   Pulmonary:      Effort: Pulmonary effort is normal. No respiratory distress.   Musculoskeletal: Normal range of motion.         General: No deformity.   Skin:     General: Skin is warm and dry.      Coloration: Skin is not pale.      Findings: Rash present.      Comments: Small, erythematous, macular rash at bilateral forearms   Neurological:      Mental Status: She is alert and oriented to person, place, and time.      Sensory: No sensory deficit.      Motor: No weakness.      Coordination: Coordination normal.   Psychiatric:         Behavior: Behavior normal. Behavior is cooperative.       Assessment/Plan:     1. Ear swelling, right  - diphenhydrAMINE (BENADRYL) 50 MG Cap; Take 1 Cap by mouth every 6 hours as needed for Itching or Rash.  Dispense: 30 Cap; Refill: 0    2. Pruritus    3. Rash and nonspecific skin eruption  - diphenhydrAMINE (BENADRYL) 50 MG Cap; Take 1 " Cap by mouth every 6 hours as needed for Itching or Rash.  Dispense: 30 Cap; Refill: 0    4. Cellulitis of auricle of right ear  - amoxicillin (AMOXIL) 500 MG Cap; Take 1 Cap by mouth 3 times a day for 5 days.  Dispense: 15 Cap; Refill: 0    Rx as above sent electronically. Likely allergic etiology from handling of tomato plant. However, will cover for cellulitis given worsening symptoms of erythema, warmth, and swelling of the right external ear.    Differential diagnosis, natural history, supportive care, over-the-counter symptom management per 's instructions, close monitoring, and indications for immediate follow-up discussed. Return precautions discussed.    Patient advised to: Return for 1) Symptoms that worsen/don't improve, or go to ER, 2) Follow up with primary care in 7-10 days.    All questions answered. Patient agrees with the plan of care.    Discharge summary provided.

## 2020-11-17 ENCOUNTER — OFFICE VISIT (OUTPATIENT)
Dept: URGENT CARE | Facility: CLINIC | Age: 55
End: 2020-11-17
Payer: COMMERCIAL

## 2020-11-17 VITALS
HEIGHT: 65 IN | SYSTOLIC BLOOD PRESSURE: 146 MMHG | WEIGHT: 178 LBS | DIASTOLIC BLOOD PRESSURE: 76 MMHG | HEART RATE: 62 BPM | RESPIRATION RATE: 18 BRPM | BODY MASS INDEX: 29.66 KG/M2 | TEMPERATURE: 97 F | OXYGEN SATURATION: 97 %

## 2020-11-17 DIAGNOSIS — L29.9 PRURITUS: ICD-10-CM

## 2020-11-17 DIAGNOSIS — L23.9 ALLERGIC DERMATITIS: ICD-10-CM

## 2020-11-17 DIAGNOSIS — L50.9 URTICARIA: ICD-10-CM

## 2020-11-17 PROCEDURE — 99214 OFFICE O/P EST MOD 30 MIN: CPT | Performed by: FAMILY MEDICINE

## 2020-11-17 RX ORDER — LORATADINE 10 MG/1
10 CAPSULE, LIQUID FILLED ORAL DAILY
Qty: 30 CAP | Refills: 1 | Status: SHIPPED | OUTPATIENT
Start: 2020-11-17

## 2020-11-17 RX ORDER — HYDROXYZINE HYDROCHLORIDE 25 MG/1
25 TABLET, FILM COATED ORAL 3 TIMES DAILY PRN
Qty: 20 TAB | Refills: 0 | Status: SHIPPED | OUTPATIENT
Start: 2020-11-17 | End: 2020-11-20

## 2020-11-17 ASSESSMENT — ENCOUNTER SYMPTOMS
VOMITING: 0
NAUSEA: 0
SHORTNESS OF BREATH: 0
SORE THROAT: 0
CHILLS: 0
DIZZINESS: 0
COUGH: 0
FEVER: 0
MYALGIAS: 0

## 2020-11-18 NOTE — PATIENT INSTRUCTIONS
Angioedema    Angioedema is sudden swelling in the body. The swelling can happen in any part of the body. It often happens on the skin and causes itchy, bumpy patches (hives) to form.  This condition may:  · Happen only one time.  · Happen more than one time. It may come back at random times.  · Keep coming back for a number of years. Someday it may stop coming back.  Follow these instructions at home:  · Take over-the-counter and prescription medicines only as told by your doctor.  · If you were given medicines for emergency allergy treatment, always carry them with you.  · Wear a medical bracelet as told by your doctor.  · Avoid the things that cause your attacks (triggers).  · If this condition was passed to you from your parents and you want to have kids, talk to your doctor. Your kids may also have this condition.  Contact a doctor if:  · You have another attack.  · Your attacks happen more often, even after you take steps to prevent them.  · This condition was passed to you by your parents and you want to have kids.  Get help right away if:  · Your mouth, tongue, or lips get very swollen.  · You have trouble breathing.  · You have trouble swallowing.  · You pass out (faint).  This information is not intended to replace advice given to you by your health care provider. Make sure you discuss any questions you have with your health care provider.  Document Released: 12/06/2010 Document Revised: 11/30/2018 Document Reviewed: 06/27/2017  [employer]vier Patient Education © 2020 [employer]

## 2020-11-18 NOTE — PROGRESS NOTES
Subjective:   Consuelo Calabrese is a 55 y.o. female who presents for Allergic Reaction (rash, itchy, allergic reaction, all over body, mostly hands)        A qualified  was used to interpret   during this encounter.  ’s name/ID number was  Carole and mode of interpretation was   Ipad .      Allergic Reaction  This is a new problem. Episode onset: 2 days prior. The problem occurs constantly. Associated symptoms include a rash. Pertinent negatives include no chills, coughing, fever, myalgias, nausea, sore throat or vomiting. Associated symptoms comments: Complains of rash on bilateral upper extremities chest, red, itchy, onset after exposure to Clorox bleach and not rinsing hands immediately after after. Nothing aggravates the symptoms. She has tried nothing for the symptoms. The treatment provided no relief.     PMH:  has a past medical history of Diabetes.  MEDS:   Current Outpatient Medications:   •  Loratadine (CLARITIN) 10 MG Cap, Take 10 mg by mouth every day., Disp: 30 Cap, Rfl: 1  •  hydrOXYzine HCl (ATARAX) 25 MG Tab, Take 1 Tab by mouth 3 times a day as needed for Itching for up to 3 days., Disp: 20 Tab, Rfl: 0  •  diphenhydrAMINE (BENADRYL) 50 MG Cap, Take 1 Cap by mouth every 6 hours as needed for Itching or Rash., Disp: 30 Cap, Rfl: 0  •  Empagliflozin (JARDIANCE) 25 MG Tab, Take  by mouth., Disp: , Rfl:   •  atorvastatin (LIPITOR) 40 MG Tab, Take 40 mg by mouth every evening., Disp: , Rfl:   •  ASPIRIN 81 PO, Take  by mouth., Disp: , Rfl:   •  lisinopril (PRINIVIL) 2.5 MG Tab, Take 2.5 mg by mouth every day., Disp: , Rfl:   •  Dulaglutide (TRULICITY) 1.5 MG/0.5ML Solution Pen-injector, Inject  as instructed., Disp: , Rfl:   •  metformin (GLUCOPHAGE) 500 MG TABS, Take 1 Tab by mouth 2 times a day, with meals., Disp: 60 Tab, Rfl: 1  •  pioglitazone (ACTOS) 15 MG Tab, Take 15 mg by mouth every day., Disp: , Rfl:   •  albuterol 108 (90 Base) MCG/ACT Aero Soln inhalation  aerosol, Inhale 2 Puffs by mouth every 6 hours as needed., Disp: 8.5 g, Rfl: 0  •  fluticasone (FLONASE ALLERGY RELIEF) 50 MCG/ACT nasal spray, Spray 1 Spray in nose every day., Disp: 16 g, Rfl: 0  •  azithromycin (ZITHROMAX) 250 MG Tab, Take two tablets on the first day and then one tablet for the next four days, Disp: 6 Tab, Rfl: 0  •  insulin NPH (HUMULIN,NOVOLIN) 100 UNIT/ML SUSP, Inject 20 Units as instructed 2 Times a Day., Disp: 10 mL, Rfl: 1  •  insulin regular (HUMULIN R) 100 Unit/mL SOLN, Inject 0-6 Units as instructed 3 times a day, with meals. Blood sugar before meals TID  >150: 2units with food  200-250: 4 units with food 250-300: 6 units with food >300: 8 units with food and call your doctor, Disp: 10 mL, Rfl: 1  •  guaifenesin LA (MUCINEX) 600 MG TB12, Take 1 Tab by mouth every 12 hours as needed for Cough. (Patient not taking: Reported on 1/6/2020), Disp: 28 Tab, Rfl: 0  ALLERGIES:   Allergies   Allergen Reactions   • Sulfa Drugs Anaphylaxis     SURGHX:   Past Surgical History:   Procedure Laterality Date   • VAGINAL HYSTERECTOMY SCOPE TOTAL  12/16/2008    Performed by ISAIAH MANTILLA at SURGERY SAME DAY [facility] ORS   • BLADDER SLING FEMALE  12/16/2008    Performed by ISAIAH MANTILLA at SURGERY SAME DAY [facility] ORS   • CYSTOSCOPY  12/16/2008    Performed by ISAIAH MANTILLA at SURGERY SAME DAY [facility] ORS     SOCHX:  reports that she has never smoked. She has never used smokeless tobacco. She reports that she does not drink alcohol or use drugs.  FH: History reviewed. No pertinent family history.  Review of Systems   Constitutional: Negative for chills and fever.   HENT: Negative for sore throat.    Respiratory: Negative for cough and shortness of breath.    Gastrointestinal: Negative for nausea and vomiting.   Genitourinary: Negative for dysuria.   Musculoskeletal: Negative for myalgias.   Skin: Positive for itching and rash.   Neurological: Negative for dizziness.        Objective:   BP  "146/76 (BP Location: Left arm, Patient Position: Sitting, BP Cuff Size: Adult)   Pulse 62   Temp 36.1 °C (97 °F) (Temporal)   Resp 18   Ht 1.651 m (5' 5\")   Wt 80.7 kg (178 lb)   SpO2 97%   BMI 29.62 kg/m²   Physical Exam  Vitals signs and nursing note reviewed.   Constitutional:       General: She is not in acute distress.     Appearance: She is well-developed.   HENT:      Head: Normocephalic and atraumatic.      Right Ear: External ear normal.      Left Ear: External ear normal.      Nose: Nose normal.      Mouth/Throat:      Mouth: Mucous membranes are moist.   Eyes:      Conjunctiva/sclera: Conjunctivae normal.   Cardiovascular:      Rate and Rhythm: Normal rate.   Pulmonary:      Effort: Pulmonary effort is normal. No respiratory distress.      Breath sounds: Normal breath sounds.   Abdominal:      General: There is no distension.   Musculoskeletal: Normal range of motion.   Skin:     General: Skin is warm and dry.      Findings: Rash present. Rash is urticarial. Rash is not purpuric or vesicular.          Neurological:      General: No focal deficit present.      Mental Status: She is alert and oriented to person, place, and time. Mental status is at baseline.      Gait: Gait (gait at baseline) normal.   Psychiatric:         Judgment: Judgment normal.           Assessment/Plan:   1. Allergic dermatitis    2. Urticaria  - Loratadine (CLARITIN) 10 MG Cap; Take 10 mg by mouth every day.  Dispense: 30 Cap; Refill: 1    3. Pruritus  - hydrOXYzine HCl (ATARAX) 25 MG Tab; Take 1 Tab by mouth 3 times a day as needed for Itching for up to 3 days.  Dispense: 20 Tab; Refill: 0        Discussed close monitoring, return precautions, and supportive measures of maintaining adequate fluid hydration and caloric intake, relative rest and symptom management as needed for pain and/or fever.    Differential diagnosis, natural history, supportive care, and indications for immediate follow-up discussed.     Advised the " patient to follow-up with the primary care physician for recheck, reevaluation, and consideration of further management.    Please note that this dictation was created using voice recognition software. I have worked with consultants from the vendor as well as technical experts from [employer] [employer] to optimize the interface. I have made every reasonable attempt to correct obvious errors, but I expect that there are errors of grammar and possibly content that I did not discover before finalizing the note.

## 2021-03-15 DIAGNOSIS — Z23 NEED FOR VACCINATION: ICD-10-CM

## 2022-02-14 ENCOUNTER — APPOINTMENT (OUTPATIENT)
Dept: RADIOLOGY | Facility: IMAGING CENTER | Age: 57
End: 2022-02-14
Attending: PHYSICIAN ASSISTANT
Payer: COMMERCIAL

## 2022-02-14 ENCOUNTER — OFFICE VISIT (OUTPATIENT)
Dept: URGENT CARE | Facility: CLINIC | Age: 57
End: 2022-02-14
Payer: COMMERCIAL

## 2022-02-14 VITALS
RESPIRATION RATE: 16 BRPM | HEART RATE: 85 BPM | WEIGHT: 178 LBS | HEIGHT: 67 IN | DIASTOLIC BLOOD PRESSURE: 64 MMHG | BODY MASS INDEX: 27.94 KG/M2 | SYSTOLIC BLOOD PRESSURE: 110 MMHG | OXYGEN SATURATION: 99 % | TEMPERATURE: 97 F

## 2022-02-14 DIAGNOSIS — M79.641 RIGHT HAND PAIN: ICD-10-CM
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
## 2022-02-14 DIAGNOSIS — S69.91XA INJURY OF RIGHT WRIST, INITIAL ENCOUNTER: ICD-10-CM

## 2022-02-14 DIAGNOSIS — M25.531 RIGHT WRIST PAIN: ICD-10-CM

## 2022-02-14 PROCEDURE — 99214 OFFICE O/P EST MOD 30 MIN: CPT | Performed by: PHYSICIAN ASSISTANT

## 2022-02-14 PROCEDURE — 73110 X-RAY EXAM OF WRIST: CPT | Mod: TC,RT | Performed by: RADIOLOGY

## 2022-02-14 PROCEDURE — 73130 X-RAY EXAM OF HAND: CPT | Mod: TC,RT | Performed by: PHYSICIAN ASSISTANT

## 2022-02-14 NOTE — LETTER
February 14, 2022    To Whom It May Concern:         This is confirmation that Consuelo Calabrese attended her scheduled appointment with Ravi Castillo P.A.-C. on 2/14/22. Please accommodate this patient with lighter duty secondary to injury. She will need to feel much better or will need further clearance to return to full duty next week by the specialist.        If you have any questions please do not hesitate to call me at the phone number listed below.    Sincerely,          Ravi Castillo P.A.-C.  [phone]

## 2022-02-17 ASSESSMENT — ENCOUNTER SYMPTOMS
SENSORY CHANGE: 0
FALLS: 1
LOSS OF CONSCIOUSNESS: 0
TINGLING: 0

## 2022-02-17 NOTE — PROGRESS NOTES
"Subjective     Consuelo Calabrese is a 56 y.o. female who presents with Fall (fell landing on her right hand and both knees)            Patient is a 56-year-old female who presents to urgent care with her daughter who provides history and translation today.  Patient reports that she was walking to her neighbors house 2 days ago when it was dark tripping falling forward with her right hand and wrist underneath her.  She did not hit her head or neck and denies loss of consciousness.  Patient has been attempting to ice the region with mild improvement of symptoms.  Of note she denies any new numbness or tingling into her fingers.  She reports mild pain into the hand however more localizes to the wrist.  Patient did hit both of her knees however feels that these are \"only sore \".  She does have small abrasions to the areas of which she is been keeping clean and dry.    Fall  Incident onset: 2 days ago. She fell from a height of 1 to 2 ft. She landed on concrete. Point of impact: Right hand and wrist. The symptoms are aggravated by pressure on injury, flexion and use of injured limb. Pertinent negatives include no loss of consciousness or tingling.       Review of Systems   Musculoskeletal: Positive for falls and joint pain.   Neurological: Negative for tingling, sensory change and loss of consciousness.   All other systems reviewed and are negative.             Objective     /64   Pulse 85   Temp 36.1 °C (97 °F) (Temporal)   Resp 16   Ht 1.702 m (5' 7\")   Wt 80.7 kg (178 lb)   SpO2 99%   BMI 27.88 kg/m²    PMH:  has a past medical history of Diabetes.  MEDS: Reviewed .   ALLERGIES:   Allergies   Allergen Reactions   • Sulfa Drugs Anaphylaxis     SURGHX:   Past Surgical History:   Procedure Laterality Date   • VAGINAL HYSTERECTOMY SCOPE TOTAL  12/16/2008    Performed by ISAIAH MANTILLA at SURGERY SAME DAY [facility] ORS   • BLADDER SLING FEMALE  12/16/2008    Performed by ISAIAH MANTILLA at SURGERY " SAME DAY [facility] ORS   • CYSTOSCOPY  12/16/2008    Performed by ISAIAH MANTILLA at SURGERY SAME DAY [facility] ORS     SOCHX:  reports that she has never smoked. She has never used smokeless tobacco. She reports that she does not drink alcohol and does not use drugs.  FH: Family history was reviewed, no pertinent findings to report    Physical Exam  Vitals reviewed.   Constitutional:       General: She is not in acute distress.     Appearance: She is well-developed.   HENT:      Head: Normocephalic and atraumatic.   Eyes:      Conjunctiva/sclera: Conjunctivae normal.      Pupils: Pupils are equal, round, and reactive to light.   Neck:      Trachea: No tracheal deviation.   Cardiovascular:      Rate and Rhythm: Normal rate.   Pulmonary:      Effort: Pulmonary effort is normal.   Musculoskeletal:      Cervical back: Normal range of motion and neck supple.      Comments: Right wrist and hand: Diffuse swelling to the dorsal aspect of the wrist mild to the dorsal aspect of the hand.  Diffuse tenderness with limited range of motion.  Elbow and shoulder are nontender.  Neurovascular intact distally.  2+ pulses noted.    Knees: Bilateral abrasion superficial-without bony tenderness.  Full range of motion gait is appropriate without deficit.   Skin:     General: Skin is warm.      Comments: No rash to area exposed during the visit today.    Neurological:      Mental Status: She is alert and oriented to person, place, and time.   Psychiatric:         Behavior: Behavior normal.         Thought Content: Thought content normal.         Judgment: Judgment normal.                             Assessment & Plan        1. Injury of right wrist, initial encounter  - Referral to Sports Medicine    2. Fall, initial encounter  - DX-HAND 3+ RIGHT; Future  - DX-WRIST-COMPLETE 3+ RIGHT; Future  - Referral to Sports Medicine    3. Right hand pain  - DX-HAND 3+ RIGHT; Future  - DX-WRIST-COMPLETE 3+ RIGHT; Future  - Referral to Sports  Medicine                RADIOLOGY RESULTS   DX-HAND 3+ RIGHT    Result Date: 2/14/2022 2/14/2022 11:32 AM HISTORY/REASON FOR EXAM:  Pain/Deformity Following Trauma; RM 6 . TECHNIQUE/EXAM DESCRIPTION AND NUMBER OF VIEWS: 3 views of the RIGHT hand. COMPARISON:  None FINDINGS: There is no evidence of fracture or dislocation. There is mild interphalangeal joint space narrowing. There is periarticular osteopenia. No osseous erosion is identified. There is mild soft tissue swelling of the dorsal hand.     1.  No evidence of acute fracture or dislocation. 2.  Mild periarticular osteopenia. 3.  Mild dorsal soft tissue swelling.    DX-WRIST-COMPLETE 3+ RIGHT    Result Date: 2/14/2022 2/14/2022 11:32 AM HISTORY/REASON FOR EXAM:  Pain/Deformity Following Trauma. TECHNIQUE/EXAM DESCRIPTION AND NUMBER OF VIEWS:  4 views of the RIGHT wrist. COMPARISON: None FINDINGS: No acute fracture or subluxation is seen. Mild ulnar positive variance with some sclerosis in the adjacent lunate Normal carpal relationships     No radiographic evidence of acute traumatic injury. Mild ulnar positive variance with findings suspicious for ulnocarpal impaction       Encouraged utilization of a brace, over-the-counter NSAIDs, ice.  Work note was provided referral was made to sports medicine for patient to have close follow-up if failing conservative therapies.  Appropriate PPE worn at all times by provider.   Pt. Had face mask on throughout entirety of the visit other than oropharyngeal examination today.     Side effects of OTC or prescribed medications discussed.     DDX, Supportive care, and indications for immediate follow-up discussed with patient.    Instructed to return to clinic or nearest emergency department if we are not available for any change in condition, further concerns, or worsening of symptoms.    The patient and/or guardian demonstrated a good understanding and agreed with the treatment plan.    Please note that this dictation was  created using voice recognition software. I have made every reasonable attempt to correct obvious errors, but I expect that there are errors of grammar and possibly content that I did not discover before finalizing the note.

## 2022-03-01 ENCOUNTER — OFFICE VISIT (OUTPATIENT)
Dept: SPORTS MEDICINE | Facility: CLINIC | Age: 57
End: 2022-03-01
Payer: COMMERCIAL

## 2022-03-01 ENCOUNTER — APPOINTMENT (OUTPATIENT)
Dept: RADIOLOGY | Facility: IMAGING CENTER | Age: 57
End: 2022-03-01
Attending: FAMILY MEDICINE
Payer: COMMERCIAL

## 2022-03-01 VITALS
RESPIRATION RATE: 16 BRPM | WEIGHT: 178 LBS | HEIGHT: 67 IN | DIASTOLIC BLOOD PRESSURE: 72 MMHG | SYSTOLIC BLOOD PRESSURE: 116 MMHG | HEART RATE: 78 BPM | OXYGEN SATURATION: 98 % | BODY MASS INDEX: 27.94 KG/M2 | TEMPERATURE: 98.2 F

## 2022-03-01 DIAGNOSIS — S62.344A NONDISPLACED FRACTURE OF BASE OF FOURTH METACARPAL BONE, RIGHT HAND, INITIAL ENCOUNTER FOR CLOSED FRACTURE: ICD-10-CM

## 2022-03-01 DIAGNOSIS — S69.91XA INJURY, WRIST, RIGHT, INITIAL ENCOUNTER: ICD-10-CM

## 2022-03-01 DIAGNOSIS — M25.531 WRIST PAIN, RIGHT: ICD-10-CM

## 2022-03-01 PROCEDURE — 73110 X-RAY EXAM OF WRIST: CPT | Mod: TC,RT | Performed by: FAMILY MEDICINE

## 2022-03-01 PROCEDURE — 26600 TREAT METACARPAL FRACTURE: CPT | Mod: RT | Performed by: FAMILY MEDICINE

## 2022-03-01 PROCEDURE — 99213 OFFICE O/P EST LOW 20 MIN: CPT | Mod: 57 | Performed by: FAMILY MEDICINE

## 2022-03-01 NOTE — PROGRESS NOTES
Chief Complaint   Patient presents with   • Wrist Injury     Referral from / R wrist & hand injury        Subjective     Referred by Ravi Castillo PA-C  for evaluation of RIGHT wrist pain (right-handed dominant)  Date of injury, February 13, 2022  Mechanism injury, walking on the front of her house and tripped on a speed bump onto both knees and sustained a forced volar flexion injury to the RIGHT wrist  Sudden sharp pain  Pain is predominantly along the RIGHT dorsal aspect of the hand and into the fingers  She can have some volar wrist pain as well with dorsiflexion of the wrist  Improved with rest and immobilization  Worse with movement and use of the hand/wrist  Denies any prior injuries or issues with the RIGHT wrist or hand  Tylenol for pain which is helping minimally  She denies any issues with sleeping  Swelling persists, worse in the mornings    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  MEDS:   Current Outpatient Medications:   •  Loratadine (CLARITIN) 10 MG Cap, Take 10 mg by mouth every day., Disp: 30 Cap, Rfl: 1  •  diphenhydrAMINE (BENADRYL) 50 MG Cap, Take 1 Cap by mouth every 6 hours as needed for Itching or Rash., Disp: 30 Cap, Rfl: 0  •  Empagliflozin (JARDIANCE) 25 MG Tab, Take  by mouth., Disp: , Rfl:   •  atorvastatin (LIPITOR) 40 MG Tab, Take 40 mg by mouth every evening., Disp: , Rfl:   •  ASPIRIN 81 PO, Take  by mouth., Disp: , Rfl:   •  lisinopril (PRINIVIL) 2.5 MG Tab, Take 2.5 mg by mouth every day., Disp: , Rfl:   •  Dulaglutide (TRULICITY) 1.5 MG/0.5ML Solution Pen-injector, Inject  as instructed., Disp: , Rfl:   •  metformin (GLUCOPHAGE) 500 MG TABS, Take 1 Tab by mouth 2 times a day, with meals., Disp: 60 Tab, Rfl: 1  ALLERGIES:   Allergies   Allergen Reactions   • Sulfa Drugs Anaphylaxis     SURGHX:   Past Surgical History:   Procedure Laterality Date   • VAGINAL HYSTERECTOMY SCOPE TOTAL   "12/16/2008    Performed by ISAIAH MANTILLA at SURGERY SAME DAY [facility] ORS   • BLADDER SLING FEMALE  12/16/2008    Performed by ISAIAH MANTILLA at SURGERY SAME DAY [facility]   • CYSTOSCOPY  12/16/2008    Performed by ISAIAH MANTILLA at SURGERY SAME DAY [facility]     SOCHX:  reports that she has never smoked. She has never used smokeless tobacco. She reports that she does not drink alcohol and does not use drugs.  FH: Family history was reviewed, no pertinent findings to report    Objective   /72 (BP Location: Left arm, Patient Position: Sitting, BP Cuff Size: Adult)   Pulse 78   Temp 36.8 °C (98.2 °F) (Temporal)   Resp 16   Ht 1.702 m (5' 7\")   Wt 80.7 kg (178 lb)   SpO2 98%   BMI 27.88 kg/m²     Hand exam    NAD  Alert and oriented    BILATERAL WRIST exam  Range of motion intact  No tenderness along scaphoid, TFCC insertion, POSITIVE tenderness along the distal radius or distal ulna  Tinel's testing is NEGATIVE  The hand is otherwise neurovascularly intact    BILATERAL hand exam  POSITIVE RIGHT hand swelling  NO deformity  POSITIVE tenderness along the base of the fourth metacarpal  Range of motion of all MCP, DIP and PIP joints NORMAL  Pinky opposition NORMAL  Grind test is NEGATIVE  Collateral ligament testing is NORMAL    1. Nondisplaced fracture of base of fourth metacarpal bone, right hand, initial encounter for closed fracture  DX-WRIST-COMPLETE 3+ RIGHT   2. Injury, wrist, right, initial encounter  DX-WRIST-COMPLETE 3+ RIGHT     Date of injury, February 13, 2022  Mechanism injury, walking on the front of her house and tripped on a speed bump onto both knees and sustained a forced volar flexion injury to the RIGHT wrist  Sudden sharp pain    NEW diagnosis of fourth metacarpal fracture not noticed on initial x-rays    Fracture was immobilized in a custom removable ulnar gutter splint the plan is to continue fracture immobilization for a total of 4 weeks from the date of injury " (until on or after March 13, 2022)    Return in about 2 weeks (around 3/15/2022).   For fracture check        3/1/2022 10:31 AM     HISTORY/REASON FOR EXAM:  Pain following trauma.     TECHNIQUE/EXAM DESCRIPTION AND NUMBER OF VIEWS:  4 views of the RIGHT wrist.     COMPARISON: 2/14/2022     FINDINGS:     BONE MINERALIZATION: Normal.  JOINTS: Preserved. No erosions.  FRACTURE: Suspect cortical fracture at the fourth metacarpal base.  DISLOCATION: None.  SOFT TISSUES: No mass.     IMPRESSION:     . No other fracture or dislocation.             Exam Ended: 03/01/22 10:31 AM Last Resulted: 03/01/22 10:56 AM           Interpreted in the office today with the patient      2/14/2022 11:32 AM     HISTORY/REASON FOR EXAM:  Pain/Deformity Following Trauma.     TECHNIQUE/EXAM DESCRIPTION AND NUMBER OF VIEWS:  4 views of the RIGHT wrist.     COMPARISON: None     FINDINGS:  No acute fracture or subluxation is seen.     Mild ulnar positive variance with some sclerosis in the adjacent lunate     Normal carpal relationships     IMPRESSION:     No radiographic evidence of acute traumatic injury.     Mild ulnar positive variance with findings suspicious for ulnocarpal impaction             Exam Ended: 02/14/22 11:47 AM Last Resulted: 02/14/22 11:56 AM           Interpreted in the office today with the patient    Thank you Ravi Castillo PA-C for allowing me to participate in caring for your patient.      Disability forms completed and will be scanned.

## 2022-03-15 ENCOUNTER — OFFICE VISIT (OUTPATIENT)
Dept: SPORTS MEDICINE | Facility: CLINIC | Age: 57
End: 2022-03-15
Payer: COMMERCIAL

## 2022-03-15 VITALS
TEMPERATURE: 97.5 F | HEIGHT: 67 IN | HEART RATE: 78 BPM | OXYGEN SATURATION: 97 % | WEIGHT: 178 LBS | BODY MASS INDEX: 27.94 KG/M2 | RESPIRATION RATE: 16 BRPM | DIASTOLIC BLOOD PRESSURE: 78 MMHG | SYSTOLIC BLOOD PRESSURE: 122 MMHG

## 2022-03-15 DIAGNOSIS — S69.91XA INJURY, WRIST, RIGHT, INITIAL ENCOUNTER: ICD-10-CM

## 2022-03-15 DIAGNOSIS — S62.344A NONDISPLACED FRACTURE OF BASE OF FOURTH METACARPAL BONE, RIGHT HAND, INITIAL ENCOUNTER FOR CLOSED FRACTURE: ICD-10-CM

## 2022-03-15 PROCEDURE — 99024 POSTOP FOLLOW-UP VISIT: CPT | Performed by: FAMILY MEDICINE

## 2022-03-15 NOTE — PROGRESS NOTES
"Chief Complaint   Patient presents with   • Wrist Injury     Referral from UC/ R wrist & hand injury      Subjective     Referred by Ravi Castillo PA-C  for evaluation of RIGHT wrist pain (right-handed dominant)  Date of injury, February 13, 2022  Mechanism injury, walking on the front of her house and tripped on a speed bump onto both knees and sustained a forced volar flexion injury to the RIGHT wrist  Sudden sharp pain  Pain is predominantly along the RIGHT dorsal aspect of the hand and into the fingers  She can have some volar wrist pain as well with dorsiflexion of the wrist  Improved with rest and immobilization    Doing WELL, no pain in her splint    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  She likes to walk    Objective   /78 (BP Location: Left arm, Patient Position: Sitting, BP Cuff Size: Adult)   Pulse 78   Temp 36.4 °C (97.5 °F) (Temporal)   Resp 16   Ht 1.702 m (5' 7\")   Wt 80.7 kg (178 lb)   SpO2 97%   BMI 27.88 kg/m²     NAD  Alert and oriented    BILATERAL WRIST exam  NO tenderness along the distal radius or distal ulna    BILATERAL hand exam  NO RIGHT hand swelling  NO deformity  MINIMAL to no tenderness along the base of the fourth metacarpal  Range of motion of all MCP, DIP and PIP joints NORMAL  Pinky opposition NORMAL  Grind test is NEGATIVE  Collateral ligament testing is NORMAL    1. Nondisplaced fracture of base of fourth metacarpal bone, right hand, initial encounter for closed fracture     2. Injury, wrist, right, initial encounter       Date of injury, February 13, 2022  Mechanism injury, walking on the front of her house and tripped on a speed bump onto both knees and sustained a forced volar flexion injury to the RIGHT wrist  Sudden sharp pain    Discontinue ulnar gutter splint    She has been released to work with restriction of not lifting greater than 10 pounds and limiting use of the RIGHT hand.  Otherwise, she is cleared to return to full duty in 2 " weeks (as of March 30, 2022)     Follow-up as needed        3/1/2022 10:31 AM     HISTORY/REASON FOR EXAM:  Pain following trauma.     TECHNIQUE/EXAM DESCRIPTION AND NUMBER OF VIEWS:  4 views of the RIGHT wrist.     COMPARISON: 2/14/2022     FINDINGS:     BONE MINERALIZATION: Normal.  JOINTS: Preserved. No erosions.  FRACTURE: Suspect cortical fracture at the fourth metacarpal base.  DISLOCATION: None.  SOFT TISSUES: No mass.     IMPRESSION:     . No other fracture or dislocation.             Exam Ended: 03/01/22 10:31 AM Last Resulted: 03/01/22 10:56 AM               2/14/2022 11:32 AM     HISTORY/REASON FOR EXAM:  Pain/Deformity Following Trauma.     TECHNIQUE/EXAM DESCRIPTION AND NUMBER OF VIEWS:  4 views of the RIGHT wrist.     COMPARISON: None     FINDINGS:  No acute fracture or subluxation is seen.     Mild ulnar positive variance with some sclerosis in the adjacent lunate     Normal carpal relationships     IMPRESSION:     No radiographic evidence of acute traumatic injury.     Mild ulnar positive variance with findings suspicious for ulnocarpal impaction             Exam Ended: 02/14/22 11:47 AM Last Resulted: 02/14/22 11:56 AM             Thank you Ravi Castillo PA-C for allowing me to participate in caring for your patient.

## 2022-03-15 NOTE — LETTER
March 15, 2022         Patient: Consuelo Calabrese   YOB: 1965   Date of Visit: 3/15/2022            To Whom it May Concern:    Consuelo Calabrese was seen in my clinic on 3/15/2022. She may return to work, but she does have some limited use of the RIGHT hand and should avoid lifting greater than 10 pounds for 2 weeks.  As of March 30, 2022 she is cleared to work without restriction.    If you have any questions or concerns, please don't hesitate to call.        Sincerely,           Chidi Connolly M.D.  Electronically Signed

## 2022-11-05 ENCOUNTER — OFFICE VISIT (OUTPATIENT)
Dept: URGENT CARE | Facility: CLINIC | Age: 57
End: 2022-11-05
Payer: COMMERCIAL

## 2022-11-05 ENCOUNTER — APPOINTMENT (OUTPATIENT)
Dept: RADIOLOGY | Facility: IMAGING CENTER | Age: 57
End: 2022-11-05
Attending: PHYSICIAN ASSISTANT
Payer: COMMERCIAL

## 2022-11-05 VITALS
RESPIRATION RATE: 20 BRPM | SYSTOLIC BLOOD PRESSURE: 130 MMHG | HEIGHT: 65 IN | HEART RATE: 61 BPM | DIASTOLIC BLOOD PRESSURE: 70 MMHG | TEMPERATURE: 98.5 F | WEIGHT: 170.4 LBS | OXYGEN SATURATION: 98 % | BODY MASS INDEX: 28.39 KG/M2

## 2022-11-05 DIAGNOSIS — M79.672 LEFT FOOT PAIN: ICD-10-CM

## 2022-11-05 DIAGNOSIS — S92.515A CLOSED NONDISPLACED FRACTURE OF PROXIMAL PHALANX OF LESSER TOE OF LEFT FOOT, INITIAL ENCOUNTER: Primary | ICD-10-CM

## 2022-11-05 PROCEDURE — 99213 OFFICE O/P EST LOW 20 MIN: CPT | Performed by: PHYSICIAN ASSISTANT

## 2022-11-05 PROCEDURE — 73630 X-RAY EXAM OF FOOT: CPT | Mod: TC,LT | Performed by: RADIOLOGY

## 2022-11-05 RX ORDER — INSULIN DETEMIR 100 [IU]/ML
INJECTION, SOLUTION SUBCUTANEOUS
COMMUNITY

## 2022-11-05 ASSESSMENT — ENCOUNTER SYMPTOMS
NAUSEA: 0
VOMITING: 0
CHILLS: 0
TINGLING: 0
FEVER: 0
BRUISES/BLEEDS EASILY: 0
SENSORY CHANGE: 0

## 2022-11-05 NOTE — LETTER
November 5, 2022         Patient: Consuelo Calabrese   YOB: 1965   Date of Visit: 11/5/2022           To Whom it May Concern:    Consuelo Calabrese was seen in my clinic on 11/5/2022.  She should be wearing a short walking boot and doing more seated/sedentary activities until she gets cleared by orthopedics.    If you have any questions or concerns, please don't hesitate to call.        Sincerely,           Virginia Nguyen P.A.-C.  Electronically Signed

## 2022-11-06 NOTE — PROGRESS NOTES
Subjective     Consuelo Calabrese is a 57 y.o. female who presents with Foot Injury (X 4 months Left foot/toes/injury/pain/swollen/bruised/ )    HPI:  Consuelo Calabrese is a 57 y.o. female who presents today for evaluation of left foot pain.  Patient is accompanied today by her daughter-in-law, who helps to translate.  They report that in July 2022 patient accidentally kicked the underside of a wooden cabinet.  She had pain in her left foot with some swelling and bruising initially.  She says that symptoms eventually went away after a few weeks.  Over the past week, however, pain has returned and she has also developed bruising and swelling.  She denies any reinjury but does note that she was a little bit more active and walking more often prior to the onset of the symptoms.  She has no numbness or tingling.  Pain is worse with palpation and ambulation.          Review of Systems   Constitutional:  Negative for chills and fever.   Gastrointestinal:  Negative for nausea and vomiting.   Musculoskeletal:         Left foot pain   Neurological:  Negative for tingling and sensory change.   Endo/Heme/Allergies:  Does not bruise/bleed easily.         PMH:  has a past medical history of Diabetes.  MEDS:   Current Outpatient Medications:     Loratadine (CLARITIN) 10 MG Cap, Take 10 mg by mouth every day., Disp: 30 Cap, Rfl: 1    diphenhydrAMINE (BENADRYL) 50 MG Cap, Take 1 Cap by mouth every 6 hours as needed for Itching or Rash., Disp: 30 Cap, Rfl: 0    Empagliflozin (JARDIANCE) 25 MG Tab, Take  by mouth., Disp: , Rfl:     atorvastatin (LIPITOR) 40 MG Tab, Take 40 mg by mouth every evening., Disp: , Rfl:     ASPIRIN 81 PO, Take  by mouth., Disp: , Rfl:     lisinopril (PRINIVIL) 2.5 MG Tab, Take 2.5 mg by mouth every day., Disp: , Rfl:     Dulaglutide (TRULICITY) 1.5 MG/0.5ML Solution Pen-injector, Inject  as instructed., Disp: , Rfl:     metformin (GLUCOPHAGE) 500 MG TABS, Take 1 Tab by mouth 2 times a day, with  "meals., Disp: 60 Tab, Rfl: 1  ALLERGIES:   Allergies   Allergen Reactions    Sulfa Drugs Anaphylaxis     SURGHX:   Past Surgical History:   Procedure Laterality Date    VAGINAL HYSTERECTOMY SCOPE TOTAL  12/16/2008    Performed by ISAIAH MANTILLA at [facility]    BLADDER SLING FEMALE  12/16/2008    Performed by ISAIAH MANTILLA at [facility]    CYSTOSCOPY  12/16/2008    Performed by ISAIAH MANTILLA at [facility]     SOCHX:  reports that she has never smoked. She has never used smokeless tobacco. She reports that she does not drink alcohol and does not use drugs.  FH: Family history was reviewed, no pertinent findings to report      Objective     /70 (BP Location: Left arm, Patient Position: Sitting)   Pulse 61   Temp 36.9 °C (98.5 °F) (Temporal)   Resp 20   Ht 1.651 m (5' 5\")   Wt 77.3 kg (170 lb 6.4 oz)   SpO2 98%   BMI 28.36 kg/m²      Physical Exam  Constitutional:       General: She is not in acute distress.     Appearance: She is not diaphoretic.   HENT:      Head: Normocephalic and atraumatic.      Right Ear: External ear normal.      Left Ear: External ear normal.   Eyes:      Conjunctiva/sclera: Conjunctivae normal.      Pupils: Pupils are equal, round, and reactive to light.   Pulmonary:      Effort: Pulmonary effort is normal. No respiratory distress.   Musculoskeletal:      Cervical back: Normal range of motion.      Comments: Left foot: There is diffuse ecchymosis noted over the distal metatarsals and MTP joints of the second through fourth digits.  Third toe is tender to palpation and there is soft tissue swelling noted over the second and third toes as well.  Distal neurovascular status intact.  Cap refill of all toes less than 2 seconds.  Mildly antalgic gait.   Skin:     Findings: No rash.   Neurological:      Mental Status: She is alert and oriented to person, place, and time.   Psychiatric:         Mood and Affect: Mood " and affect normal.         Cognition and Memory: Memory normal.         Judgment: Judgment normal.       DX-FOOT-COMPLETE 3+ LEFT  FINDINGS:  The alignment is grossly normal.  There is no evidence of displaced fracture or dislocation. Previously noted fracture through the base of the fifth metatarsal is healed. There appears to be subacute to chronic fracture through the neck of the third proximal phalanx.  There is no focal soft tissue swelling.     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     IMPRESSION:  Subacute to chronic fracture through the neck of the third proximal phalanx.     Healed fracture through the base of the fifth metatarsal.     First metatarsophalangeal joint osteoarthritis.  Assessment & Plan       1. Left foot pain  - DX-FOOT-COMPLETE 3+ LEFT; Future    2. Closed nondisplaced fracture of proximal phalanx of lesser toe of left foot, initial encounter  - Referral to Sports Medicine    -Second and third toes of left foot were buddy taped and short walking boot provided.  - Apply ice multiple times per day  - OTC analgesics as needed for pain  - Keep elevated as much as possible  - Follow up with sports medicine for more definitive management.        Differential Diagnosis, natural history, and supportive care discussed. Return to the Urgent Care or follow up with your PCP if symptoms fail to resolve, or for any new or worsening symptoms. Emergency room precautions discussed. Patient and/or family appears understanding of information.

## 2022-11-14 ENCOUNTER — OFFICE VISIT (OUTPATIENT)
Dept: SPORTS MEDICINE | Facility: CLINIC | Age: 57
End: 2022-11-14
Payer: COMMERCIAL

## 2022-11-14 VITALS
DIASTOLIC BLOOD PRESSURE: 76 MMHG | HEART RATE: 78 BPM | SYSTOLIC BLOOD PRESSURE: 122 MMHG | OXYGEN SATURATION: 99 % | TEMPERATURE: 98.3 F | BODY MASS INDEX: 28.39 KG/M2 | RESPIRATION RATE: 16 BRPM | HEIGHT: 65 IN | WEIGHT: 170.4 LBS

## 2022-11-14 DIAGNOSIS — G57.62 MORTON'S NEUROMA OF SECOND INTERSPACE OF LEFT FOOT: ICD-10-CM

## 2022-11-14 PROCEDURE — 64455 NJX AA&/STRD PLTR COM DG NRV: CPT | Mod: LT | Performed by: FAMILY MEDICINE

## 2022-11-14 PROCEDURE — 99213 OFFICE O/P EST LOW 20 MIN: CPT | Mod: 25 | Performed by: FAMILY MEDICINE

## 2022-11-14 RX ORDER — TRIAMCINOLONE ACETONIDE 40 MG/ML
20 INJECTION, SUSPENSION INTRA-ARTICULAR; INTRAMUSCULAR ONCE
Status: COMPLETED | OUTPATIENT
Start: 2022-11-14 | End: 2022-11-14

## 2022-11-14 RX ADMIN — TRIAMCINOLONE ACETONIDE 20 MG: 40 INJECTION, SUSPENSION INTRA-ARTICULAR; INTRAMUSCULAR at 17:01

## 2022-11-15 NOTE — PROCEDURES
PROCEDURE NOTE:  left FOOT second interspace corticosteroid injection  Risks and benefits discussed  Informed consent obtained  Ankle prepped in sterile fashion utilizing a bon- lateral approach  20 mg of Kenalog and 0.5 cc of 2% lidocaine injected into the LEFT foot second interspace  Vapocoolant spray was utilized  Patient tolerated the procedure well  Postprocedure care and red flags discussed

## 2022-11-15 NOTE — PROGRESS NOTES
Chief Complaint   Patient presents with    Toe Injury     EP/ L little toe injury      Subjective     Referred by  Virginia Nguyen P.A.-C.  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  She likes to walk    Objective   Chief Complaint   Patient presents with    Toe Injury     EP/ L little toe injury        LEFT ANKLE:  There is NO swelling noted at the ankle  Range of motion intact with dorsiflexion and plantarflexion, inversion and eversion  There is NO tenderness of the ATFL, CF or PTF ligament  There is NO tenderness of the lateral malleolus or medial malleolus  Anterior drawer testing is NEGATIVE  Talar tilt testing is NEGATIVE  The foot and ankle is otherwise neurovascularly intact    LEFT FOOT:  There is NO swelling noted at the foot  There is NO tenderness at the base of the fifth metatarsal, cuboid, or tarsal navicular  There is NO pain with metatarsal squeeze test with POSITIVE tenderness at the second interspace and mildly tender at the third interspace    NEUTRAL stance  Able to ambulate with NORMAL gait    1. 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  triamcinolone acetonide (KENALOG-40) injection 20 mg         Back in July 2022 she inadvertently kicked a piece of furniture  Injuring her 3rd toe injury, pain went away after 1 month or so, had some mild pain with mid-heels, but otherwise she did okay  As time went on she noticed  bruising    Her third proximal phalanx fracture of the LEFT foot is clinically healed  She had pain for the expected amount of recovery time back when the injury occurred and she is self treated  Her pain/presentation is more consistent with Dueñas's neuroma    LEFT foot Dueñas's neuroma of the second interspace corticosteroid injection performed in the office TODAY (November 14, 2022)    Return in about 4 weeks (around 12/12/2022).  To see how she is doing after LEFT second interspace Dueñas's neuroma corticosteroid injection  We can consider corticosteroid injection of the third interspace of the LEFT foot if her symptoms there persist or worsen        11/5/2022 5:22 PM     HISTORY/REASON FOR EXAM:  Atraumatic Pain/Swelling/Deformity; 1 week of atraumatic left foot pain/bruising most notable over the MTP joints of the second through fourth toes.  No recent injury but did hit this area on a cabinet back in July.        TECHNIQUE/EXAM DESCRIPTION AND NUMBER OF VIEWS:  3 views of the LEFT foot.     COMPARISON:  October 12, 2020     FINDINGS:  The alignment is grossly normal.  There is no evidence of displaced fracture or dislocation. Previously noted fracture through the base of the fifth metatarsal is healed. There appears to be subacute to chronic fracture through the neck of the third proximal phalanx.  There is no focal soft tissue swelling.     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     IMPRESSION:     Subacute to chronic fracture through the neck of the third proximal phalanx.     Healed fracture through the base of the fifth metatarsal.     First metatarsophalangeal joint osteoarthritis.           Exam Ended: 11/05/22  5:35 PM Last Resulted: 11/05/22  5:39 PM              Interpreted in the office today with the patient          3/1/2022 10:31 AM     HISTORY/REASON FOR EXAM:  Pain following trauma.     TECHNIQUE/EXAM DESCRIPTION AND NUMBER OF VIEWS:  4 views of the RIGHT wrist.      COMPARISON: 2/14/2022     FINDINGS:     BONE MINERALIZATION: Normal.  JOINTS: Preserved. No erosions.  FRACTURE: Suspect cortical fracture at the fourth metacarpal base.  DISLOCATION: None.  SOFT TISSUES: No mass.     IMPRESSION:     . No other fracture or dislocation.             Exam Ended: 03/01/22 10:31 AM Last Resulted: 03/01/22 10:56 AM               2/14/2022 11:32 AM     HISTORY/REASON FOR EXAM:  Pain/Deformity Following Trauma.     TECHNIQUE/EXAM DESCRIPTION AND NUMBER OF VIEWS:  4 views of the RIGHT wrist.     COMPARISON: None     FINDINGS:  No acute fracture or subluxation is seen.     Mild ulnar positive variance with some sclerosis in the adjacent lunate     Normal carpal relationships     IMPRESSION:     No radiographic evidence of acute traumatic injury.     Mild ulnar positive variance with findings suspicious for ulnocarpal impaction             Exam Ended: 02/14/22 11:47 AM Last Resulted: 02/14/22 11:56 AM             Thank you Virginia Nguyen P.A.-C. for allowing me to participate in caring for your patient.

## 2022-12-12 ENCOUNTER — OFFICE VISIT (OUTPATIENT)
Dept: SPORTS MEDICINE | Facility: CLINIC | Age: 57
End: 2022-12-12
Payer: COMMERCIAL

## 2022-12-12 VITALS
OXYGEN SATURATION: 98 % | SYSTOLIC BLOOD PRESSURE: 124 MMHG | BODY MASS INDEX: 28.39 KG/M2 | HEIGHT: 65 IN | DIASTOLIC BLOOD PRESSURE: 78 MMHG | TEMPERATURE: 97.7 F | WEIGHT: 170.4 LBS | RESPIRATION RATE: 16 BRPM | HEART RATE: 74 BPM

## 2022-12-12 DIAGNOSIS — G57.62 MORTON'S NEUROMA OF SECOND INTERSPACE OF LEFT FOOT: ICD-10-CM

## 2022-12-12 PROCEDURE — 99213 OFFICE O/P EST LOW 20 MIN: CPT | Performed by: FAMILY MEDICINE

## 2022-12-12 NOTE — Clinical Note
Eris Coleman, We saw Consuelo back for her Dueñas's neuroma.  Fortunately her she responded VERY WELL to her corticosteroid injection.  She is doing so well at this point we will have her follow-up on an as-needed basis. I hope you have an amazing holiday season! L

## 2022-12-13 NOTE — PROGRESS NOTES
"Chief Complaint   Patient presents with    Toe Injury     F/V L toe injury      Subjective     Referred by  Virginia Nguyen P.A.-C.  for evaluation of LEFT foot pain  Back in July 2022 she inadvertently kicked a piece of furniture  Injuring her 3rd toe injury, pain went away after 1 month or so, had some mild pain with mid-heels, but otherwise she did okay  As time went on she noticed bruising  Bruising since July  Not taking meds for her pain  Prior little toe fracture on the same foot  Then, over the past 2 to 3 weeks, she has had recurrence of pain in the LEFT foot    She is feeling MUCH BETTER after her LEFT foot Dueñas's neuroma corticosteroid injection at the second interspace  She is no longer experiencing pain at the third interspace    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  No recent injury but did hit this area on a cabinet back in July.        TECHNIQUE/EXAM DESCRIPTION AND NUMBER OF VIEWS:  3 views of the LEFT foot.   "   COMPARISON:  October 12, 2020     FINDINGS:  The alignment is grossly normal.  There is no evidence of displaced fracture or dislocation. Previously noted fracture through the base of the fifth metatarsal is healed. There appears to be subacute to chronic fracture through the neck of the third proximal phalanx.  There is no focal soft tissue swelling.     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     IMPRESSION:     Subacute to chronic fracture through the neck of the third proximal phalanx.     Healed fracture through the base of the fifth metatarsal.     First metatarsophalangeal joint osteoarthritis.           Exam Ended: 11/05/22  5:35 PM Last Resulted: 11/05/22  5:39 PM                    3/1/2022 10:31 AM     HISTORY/REASON FOR EXAM:  Pain following trauma.     TECHNIQUE/EXAM DESCRIPTION AND NUMBER OF VIEWS:  4 views of the RIGHT wrist.     COMPARISON: 2/14/2022     FINDINGS:     BONE MINERALIZATION: Normal.  JOINTS: Preserved. No erosions.  FRACTURE: Suspect cortical fracture at the fourth metacarpal base.  DISLOCATION: None.  SOFT TISSUES: No mass.     IMPRESSION:     . No other fracture or dislocation.             Exam Ended: 03/01/22 10:31 AM Last Resulted: 03/01/22 10:56 AM               2/14/2022 11:32 AM     HISTORY/REASON FOR EXAM:  Pain/Deformity Following Trauma.     TECHNIQUE/EXAM DESCRIPTION AND NUMBER OF VIEWS:  4 views of the RIGHT wrist.     COMPARISON: None     FINDINGS:  No acute fracture or subluxation is seen.     Mild ulnar positive variance with some sclerosis in the adjacent lunate     Normal carpal relationships     IMPRESSION:     No radiographic evidence of acute traumatic injury.     Mild ulnar positive variance with findings suspicious for ulnocarpal impaction             Exam Ended: 02/14/22 11:47 AM Last Resulted: 02/14/22 11:56 AM             Thank you Virginia Nguyen P.A.-C. for allowing me to participate in caring for your patient.

## 2023-03-15 ENCOUNTER — OFFICE VISIT (OUTPATIENT)
Dept: URGENT CARE | Facility: PHYSICIAN GROUP | Age: 58
End: 2023-03-15
Payer: COMMERCIAL

## 2023-03-15 VITALS
TEMPERATURE: 97.3 F | WEIGHT: 161 LBS | BODY MASS INDEX: 26.82 KG/M2 | HEART RATE: 65 BPM | RESPIRATION RATE: 20 BRPM | SYSTOLIC BLOOD PRESSURE: 114 MMHG | OXYGEN SATURATION: 95 % | DIASTOLIC BLOOD PRESSURE: 60 MMHG | HEIGHT: 65 IN

## 2023-03-15 DIAGNOSIS — H00.021 HORDEOLUM INTERNUM OF RIGHT UPPER EYELID: ICD-10-CM

## 2023-03-15 PROCEDURE — 99213 OFFICE O/P EST LOW 20 MIN: CPT | Performed by: PHYSICIAN ASSISTANT

## 2023-03-15 RX ORDER — INSULIN GLARGINE 100 [IU]/ML
INJECTION, SOLUTION SUBCUTANEOUS
COMMUNITY
Start: 2023-01-18

## 2023-03-15 RX ORDER — AMOXICILLIN AND CLAVULANATE POTASSIUM 875; 125 MG/1; MG/1
1 TABLET, FILM COATED ORAL 2 TIMES DAILY
Qty: 14 TABLET | Refills: 0 | Status: SHIPPED | OUTPATIENT
Start: 2023-03-15 | End: 2023-03-22

## 2023-03-15 RX ORDER — ERYTHROMYCIN 5 MG/G
OINTMENT OPHTHALMIC
Qty: 3.5 G | Refills: 0 | Status: SHIPPED | OUTPATIENT
Start: 2023-03-15

## 2023-03-15 ASSESSMENT — ENCOUNTER SYMPTOMS
EYE REDNESS: 1
EYE DISCHARGE: 0
CHILLS: 0
DOUBLE VISION: 0
FEVER: 0
HEADACHES: 0
PHOTOPHOBIA: 0
VOMITING: 0
DIZZINESS: 0
NAUSEA: 0
EYE PAIN: 1
BLURRED VISION: 0

## 2023-03-15 NOTE — PROGRESS NOTES
Subjective     Consuelo Nguyen is a 57 y.o. female who presents with Eye Problem (Right eye,swollen,itchy,burning,x4 days)    HPI:  Consuelo Nguyen is a 57 y.o. female who presents today for evaluation of right eyelid swelling.  Patient reports over the past 3 to 4 days her right upper eyelid has been red, swollen, itchy.  She has been trying to apply warm teabags to the area but it has not been helping and today she is afterwards noticed some swelling into her right cheek as well.  No visual changes.  No discharge from the eye.  She does not wear contact lenses.  Denies any injury.  No fever/chills.      Review of Systems   Constitutional:  Negative for chills and fever.   Eyes:  Positive for pain and redness. Negative for blurred vision, double vision, photophobia and discharge.   Gastrointestinal:  Negative for nausea and vomiting.   Neurological:  Negative for dizziness and headaches.         PMH:  has a past medical history of Diabetes.  MEDS:   Current Outpatient Medications:     amoxicillin-clavulanate (AUGMENTIN) 875-125 MG Tab, Take 1 Tablet by mouth 2 times a day for 7 days., Disp: 14 Tablet, Rfl: 0    erythromycin 5 MG/GM Ointment, Apply to affected eyelid 3 times daily for 5-7 days, Disp: 3.5 g, Rfl: 0    insulin detemir (LEVEMIR FLEXTOUCH) 100 UNIT/ML injection PEN, , Disp: , Rfl:     Empagliflozin 25 MG Tab, Take  by mouth., Disp: , Rfl:     atorvastatin (LIPITOR) 40 MG Tab, Take 40 mg by mouth every evening., Disp: , Rfl:     ASPIRIN 81 PO, Take  by mouth., Disp: , Rfl:     lisinopril (PRINIVIL) 2.5 MG Tab, Take 2.5 mg by mouth every day., Disp: , Rfl:     metformin (GLUCOPHAGE) 500 MG TABS, Take 1 Tab by mouth 2 times a day, with meals., Disp: 60 Tab, Rfl: 1    metformin (GLUCOPHAGE) 1000 MG tablet, Take 1,000 mg by mouth 2 times a day with meals. (Patient not taking: Reported on 3/15/2023), Disp: , Rfl:     INSULIN GLARGINE 100 UNIT/ML injection PEN, INJECT 5 UNITS SUBCUTANEOUSLY DAILY  "(Patient not taking: Reported on 3/15/2023), Disp: , Rfl:     Loratadine (CLARITIN) 10 MG Cap, Take 10 mg by mouth every day. (Patient not taking: Reported on 3/15/2023), Disp: 30 Cap, Rfl: 1    diphenhydrAMINE (BENADRYL) 50 MG Cap, Take 1 Cap by mouth every 6 hours as needed for Itching or Rash. (Patient not taking: Reported on 3/15/2023), Disp: 30 Cap, Rfl: 0    Dulaglutide 1.5 MG/0.5ML Solution Pen-injector, Inject  as instructed. (Patient not taking: Reported on 3/15/2023), Disp: , Rfl:   ALLERGIES:   Allergies   Allergen Reactions    Sulfa Drugs Anaphylaxis     SURGHX:   Past Surgical History:   Procedure Laterality Date    VAGINAL HYSTERECTOMY SCOPE TOTAL  12/16/2008    Performed by ISAIAH MANTILLA at [facility]    BLADDER SLING FEMALE  12/16/2008    Performed by ISAIAH MANTILLA at [facility]    CYSTOSCOPY  12/16/2008    Performed by ISAIAH MANTILLA at [facility]     SOCHX:  reports that she has never smoked. She has never used smokeless tobacco. She reports that she does not drink alcohol and does not use drugs.  FH: Family history was reviewed, no pertinent findings to report        Objective     /60 (BP Location: Right arm, Patient Position: Sitting, BP Cuff Size: Adult)   Pulse 65   Temp 36.3 °C (97.3 °F) (Temporal)   Resp 20   Ht 1.651 m (5' 5\")   Wt 73 kg (161 lb)   SpO2 95%   BMI 26.79 kg/m²      Physical Exam  Constitutional:       General: She is not in acute distress.     Appearance: She is not diaphoretic.   HENT:      Head: Normocephalic and atraumatic.      Right Ear: External ear normal.      Left Ear: External ear normal.   Eyes:      General:         Right eye: Hordeolum present. No foreign body or discharge.      Conjunctiva/sclera: Conjunctivae normal.      Pupils: Pupils are equal, round, and reactive to light.      Comments: Right upper eyelid is diffusely erythematous and swollen with an internal stye noted " centrally.  The area is tender to palpation.  No current discharge.  There is also some mild swelling in the right cheek infraorbitally without overlying erythema or induration.   Pulmonary:      Effort: Pulmonary effort is normal. No respiratory distress.   Musculoskeletal:      Cervical back: Normal range of motion.   Skin:     Findings: No rash.   Neurological:      Mental Status: She is alert and oriented to person, place, and time.   Psychiatric:         Mood and Affect: Mood and affect normal.         Cognition and Memory: Memory normal.         Judgment: Judgment normal.         Assessment & Plan     1. Hordeolum internum of right upper eyelid  - amoxicillin-clavulanate (AUGMENTIN) 875-125 MG Tab; Take 1 Tablet by mouth 2 times a day for 7 days.  Dispense: 14 Tablet; Refill: 0  - erythromycin 5 MG/GM Ointment; Apply to affected eyelid 3 times daily for 5-7 days  Dispense: 3.5 g; Refill: 0  Physical exam consistent with a stye.  Given the fact that symptoms are worsening and starting to cause swelling below the eye as well we will treat with oral antibiotics.  Erythromycin ointment also prescribed for symptom relief.  Also recommend that she apply moist warm compresses 3-4 times daily.            Differential Diagnosis, natural history, and supportive care discussed. Return to the Urgent Care or follow up with your PCP if symptoms fail to resolve, or for any new or worsening symptoms. Emergency room precautions discussed. Patient and/or family appears understanding of information.

## 2024-03-27 ENCOUNTER — OFFICE VISIT (OUTPATIENT)
Dept: URGENT CARE | Facility: PHYSICIAN GROUP | Age: 59
End: 2024-03-27
Payer: COMMERCIAL

## 2024-03-27 VITALS
OXYGEN SATURATION: 96 % | DIASTOLIC BLOOD PRESSURE: 60 MMHG | HEART RATE: 78 BPM | TEMPERATURE: 98.8 F | RESPIRATION RATE: 16 BRPM | HEIGHT: 65 IN | SYSTOLIC BLOOD PRESSURE: 110 MMHG | BODY MASS INDEX: 28.16 KG/M2 | WEIGHT: 169 LBS

## 2024-03-27 DIAGNOSIS — J22 LRTI (LOWER RESPIRATORY TRACT INFECTION): ICD-10-CM

## 2024-03-27 PROCEDURE — 3074F SYST BP LT 130 MM HG: CPT | Performed by: PHYSICIAN ASSISTANT

## 2024-03-27 PROCEDURE — 99213 OFFICE O/P EST LOW 20 MIN: CPT | Performed by: PHYSICIAN ASSISTANT

## 2024-03-27 PROCEDURE — 3078F DIAST BP <80 MM HG: CPT | Performed by: PHYSICIAN ASSISTANT

## 2024-03-27 RX ORDER — PREDNISONE 10 MG/1
40 TABLET ORAL DAILY
Qty: 20 TABLET | Refills: 0 | Status: SHIPPED | OUTPATIENT
Start: 2024-03-27 | End: 2024-04-01

## 2024-03-27 RX ORDER — OSELTAMIVIR PHOSPHATE 75 MG/1
75 CAPSULE ORAL 2 TIMES DAILY
COMMUNITY
Start: 2024-01-30 | End: 2024-02-04

## 2024-03-27 RX ORDER — BENZONATATE 100 MG/1
100 CAPSULE ORAL 3 TIMES DAILY PRN
Qty: 30 CAPSULE | Refills: 0 | Status: SHIPPED | OUTPATIENT
Start: 2024-03-27 | End: 2024-04-06

## 2024-03-27 RX ORDER — AMOXICILLIN AND CLAVULANATE POTASSIUM 875; 125 MG/1; MG/1
TABLET, FILM COATED ORAL
COMMUNITY
Start: 2024-03-12 | End: 2024-03-27

## 2024-03-27 RX ORDER — DOXYCYCLINE HYCLATE 100 MG
100 TABLET ORAL 2 TIMES DAILY
Qty: 14 TABLET | Refills: 0 | Status: SHIPPED | OUTPATIENT
Start: 2024-03-27 | End: 2024-04-03

## 2024-03-27 RX ORDER — ALBUTEROL SULFATE 90 UG/1
2 AEROSOL, METERED RESPIRATORY (INHALATION) EVERY 6 HOURS PRN
Qty: 8.5 G | Refills: 0 | Status: SHIPPED | OUTPATIENT
Start: 2024-03-27

## 2024-03-27 RX ORDER — DULAGLUTIDE 0.75 MG/.5ML
INJECTION, SOLUTION SUBCUTANEOUS
COMMUNITY
Start: 2024-03-06

## 2024-03-27 RX ORDER — PIOGLITAZONEHYDROCHLORIDE 30 MG/1
30 TABLET ORAL DAILY
COMMUNITY
Start: 2024-03-06

## 2024-03-27 ASSESSMENT — ENCOUNTER SYMPTOMS
DIARRHEA: 0
CONSTIPATION: 0
SPUTUM PRODUCTION: 1
VOMITING: 0
HEADACHES: 0
SHORTNESS OF BREATH: 1
MYALGIAS: 1
EYE PAIN: 0
ABDOMINAL PAIN: 0
FEVER: 0
CHILLS: 1
SORE THROAT: 0
NAUSEA: 0
COUGH: 1

## 2024-03-27 NOTE — PROGRESS NOTES
"Subjective:   Consuelo Nguyen is a 58 y.o. female who presents for Cough (Difficulty breathing, headache, x4 days )      4 days ago started with cough, congestion, body aches.  Cough and difficulty breathing got worse over last 24 hours.  No fevers but notable body aches and fatigue.  No history of asthma or copd.      Review of Systems   Constitutional:  Positive for chills and malaise/fatigue. Negative for fever.   HENT:  Positive for congestion. Negative for ear pain and sore throat.    Eyes:  Negative for pain.   Respiratory:  Positive for cough, sputum production and shortness of breath.    Cardiovascular:  Positive for chest pain.   Gastrointestinal:  Negative for abdominal pain, constipation, diarrhea, nausea and vomiting.   Genitourinary:  Negative for dysuria.   Musculoskeletal:  Positive for myalgias.   Skin:  Negative for rash.   Neurological:  Negative for headaches.       Medications, Allergies, and current problem list reviewed today in Epic.     Objective:     /60 (BP Location: Left arm, Patient Position: Sitting, BP Cuff Size: Adult)   Pulse 78   Temp 37.1 °C (98.8 °F) (Temporal)   Resp 16   Ht 1.651 m (5' 5\")   Wt 76.7 kg (169 lb)   SpO2 96%     Physical Exam  Vitals reviewed.   Constitutional:       Appearance: Normal appearance.   HENT:      Head: Normocephalic and atraumatic.      Right Ear: Tympanic membrane, ear canal and external ear normal.      Left Ear: Tympanic membrane, ear canal and external ear normal.      Nose: Rhinorrhea present.      Mouth/Throat:      Mouth: Mucous membranes are moist.      Pharynx: Oropharynx is clear.   Eyes:      Conjunctiva/sclera: Conjunctivae normal.      Pupils: Pupils are equal, round, and reactive to light.   Cardiovascular:      Rate and Rhythm: Normal rate and regular rhythm.   Pulmonary:      Effort: Pulmonary effort is normal.      Breath sounds: Rales present.   Musculoskeletal:      Cervical back: Normal range of motion. No " rigidity.   Lymphadenopathy:      Cervical: No cervical adenopathy.   Skin:     General: Skin is warm and dry.      Capillary Refill: Capillary refill takes less than 2 seconds.   Neurological:      Mental Status: She is alert and oriented to person, place, and time.         Assessment/Plan:     Diagnosis and associated orders:     1. LRTI (lower respiratory tract infection)  predniSONE (DELTASONE) 10 MG Tab    albuterol 108 (90 Base) MCG/ACT Aero Soln inhalation aerosol    doxycycline (VIBRAMYCIN) 100 MG Tab    benzonatate (TESSALON) 100 MG Cap         Comments/MDM:      services were used in the patient's primary language of English.   Name or Number: Tino  Mode of interpretation: iPad  Content of Interpretation: English.    Use inhaler 3/day until significantly improved  Work note provided  Follow up in 48-72 hours if not improving         Differential diagnosis, natural history, supportive care, and indications for immediate follow-up discussed.    Advised the patient to follow-up with the primary care physician for recheck, reevaluation, and consideration of further management.    Please note that this dictation was created using voice recognition software. I have made a reasonable attempt to correct obvious errors, but I expect that there are errors of grammar and possibly content that I did not discover before finalizing the note.    This note was electronically signed by Vern Mims PA-C

## 2024-03-27 NOTE — LETTER
March 27, 2024    To Whom It May Concern:         This is confirmation that Consuelo Nguyen attended her scheduled appointment with Vern Mims P.A.-C. on 3/27/24.  Please excuse this patient for up to 4 days to recover from illness. Once she has been without fevers for 24 hours and her cough is improving she is cleared to return.          If you have any questions please do not hesitate to call me at the phone number listed below.    Sincerely,          Vern Mims P.A.-C.  [phone]

## 2024-11-12 ENCOUNTER — HOSPITAL ENCOUNTER (OUTPATIENT)
Facility: MEDICAL CENTER | Age: 59
End: 2024-11-12
Payer: COMMERCIAL

## 2024-11-12 ENCOUNTER — OFFICE VISIT (OUTPATIENT)
Dept: URGENT CARE | Facility: PHYSICIAN GROUP | Age: 59
End: 2024-11-12
Payer: COMMERCIAL

## 2024-11-12 VITALS
HEART RATE: 79 BPM | RESPIRATION RATE: 14 BRPM | DIASTOLIC BLOOD PRESSURE: 70 MMHG | OXYGEN SATURATION: 99 % | BODY MASS INDEX: 27.79 KG/M2 | TEMPERATURE: 98.3 F | SYSTOLIC BLOOD PRESSURE: 116 MMHG | WEIGHT: 167 LBS

## 2024-11-12 DIAGNOSIS — N30.00 ACUTE CYSTITIS WITHOUT HEMATURIA: ICD-10-CM

## 2024-11-12 LAB
APPEARANCE UR: NORMAL
BILIRUB UR STRIP-MCNC: NEGATIVE MG/DL
COLOR UR AUTO: NORMAL
GLUCOSE UR STRIP.AUTO-MCNC: 1000 MG/DL
KETONES UR STRIP.AUTO-MCNC: 15 MG/DL
LEUKOCYTE ESTERASE UR QL STRIP.AUTO: NORMAL
NITRITE UR QL STRIP.AUTO: POSITIVE
PH UR STRIP.AUTO: 5.5 [PH] (ref 5–8)
PROT UR QL STRIP: 300 MG/DL
RBC UR QL AUTO: NORMAL
SP GR UR STRIP.AUTO: 1.02
UROBILINOGEN UR STRIP-MCNC: 0.2 MG/DL

## 2024-11-12 PROCEDURE — 99213 OFFICE O/P EST LOW 20 MIN: CPT

## 2024-11-12 PROCEDURE — 87077 CULTURE AEROBIC IDENTIFY: CPT

## 2024-11-12 PROCEDURE — 81002 URINALYSIS NONAUTO W/O SCOPE: CPT

## 2024-11-12 PROCEDURE — 87186 SC STD MICRODIL/AGAR DIL: CPT

## 2024-11-12 PROCEDURE — 3074F SYST BP LT 130 MM HG: CPT

## 2024-11-12 PROCEDURE — 87086 URINE CULTURE/COLONY COUNT: CPT

## 2024-11-12 PROCEDURE — 3078F DIAST BP <80 MM HG: CPT

## 2024-11-12 RX ORDER — CEFDINIR 300 MG/1
300 CAPSULE ORAL 2 TIMES DAILY
Qty: 14 CAPSULE | Refills: 0 | Status: SHIPPED | OUTPATIENT
Start: 2024-11-12 | End: 2024-11-19

## 2024-11-12 RX ORDER — DULAGLUTIDE 0.75 MG/.5ML
INJECTION, SOLUTION SUBCUTANEOUS
COMMUNITY
Start: 2024-09-30

## 2024-11-12 ASSESSMENT — ENCOUNTER SYMPTOMS
CHILLS: 0
FEVER: 0
HEADACHES: 0
NAUSEA: 1
SORE THROAT: 0
DIARRHEA: 0
SHORTNESS OF BREATH: 0
COUGH: 0
VOMITING: 0
ABDOMINAL PAIN: 0
MYALGIAS: 0

## 2024-11-12 NOTE — LETTER
[facility] U[facility] [place]  [address]  [place] [address]     November 12, 2024    Patient: Consuelo Nguyen   YOB: 1965   Date of Visit: 11/12/2024       To Whom It May Concern:    Consuelo Nguyen was seen and treated in our department on 11/12/2024. Please excuse Patient for today due to recent illness.     Sincerely,     JEFFERSON Bragg.

## 2024-11-12 NOTE — PROGRESS NOTES
Subjective:   Consuelo Nguyen is a 59 y.o. female who presents for UTI (Burning while urinating,  abdominal pain, onset last night )      UTI  This is a new problem. The current episode started yesterday. Associated symptoms include nausea and urinary symptoms. Pertinent negatives include no abdominal pain, chest pain, chills, congestion, coughing, fever, headaches, myalgias, rash, sore throat or vomiting. Treatments tried: AZO. The treatment provided mild relief.       Review of Systems   Constitutional:  Negative for chills, fever and malaise/fatigue.   HENT:  Negative for congestion, ear pain, hearing loss and sore throat.    Respiratory:  Negative for cough and shortness of breath.    Cardiovascular:  Negative for chest pain.   Gastrointestinal:  Positive for nausea. Negative for abdominal pain, diarrhea and vomiting.   Genitourinary:  Positive for dysuria.   Musculoskeletal:  Negative for myalgias.   Skin:  Negative for rash.   Neurological:  Negative for headaches.       Medications, Allergies, and current problem list reviewed today in Epic.     Objective:     /70 (BP Location: Right arm, Patient Position: Sitting, BP Cuff Size: Adult)   Pulse 79   Temp 36.8 °C (98.3 °F) (Temporal)   Resp 14   Wt 75.8 kg (167 lb)   SpO2 99%     Physical Exam  Vitals and nursing note reviewed.   Constitutional:       General: She is not in acute distress.     Appearance: Normal appearance. She is not ill-appearing.   HENT:      Head: Normocephalic and atraumatic.      Right Ear: Tympanic membrane normal.      Left Ear: Tympanic membrane normal.      Nose: Nose normal.      Mouth/Throat:      Mouth: Mucous membranes are moist.   Eyes:      Conjunctiva/sclera: Conjunctivae normal.   Cardiovascular:      Rate and Rhythm: Normal rate.      Heart sounds: Normal heart sounds.   Pulmonary:      Effort: Pulmonary effort is normal.   Abdominal:      General: Abdomen is flat.      Palpations: Abdomen is soft.       Tenderness: There is no abdominal tenderness. There is no right CVA tenderness, left CVA tenderness or guarding.   Genitourinary:     Vagina: No vaginal discharge.   Musculoskeletal:         General: Normal range of motion.      Cervical back: Normal range of motion.   Skin:     General: Skin is warm and dry.      Capillary Refill: Capillary refill takes less than 2 seconds.   Neurological:      Mental Status: She is alert and oriented to person, place, and time.   Psychiatric:         Mood and Affect: Mood normal.         Behavior: Behavior normal.       Results for orders placed or performed in visit on 11/12/24   POCT Urinalysis    Collection Time: 11/12/24  9:32 AM   Result Value Ref Range    POC Color dark yellow Negative    POC Appearance cloudy Negative    POC Glucose 1,000 Negative mg/dL    POC Bilirubin negative Negative mg/dL    POC Ketones 15 Negative mg/dL    POC Specific Gravity 1.025 <1.005 - >1.030    POC Blood large Negative    POC Urine PH 5.5 5.0 - 8.0    POC Protein 300 Negative mg/dL    POC Urobiligen 0.2 Negative (0.2) mg/dL    POC Nitrites positive Negative    POC Leukocyte Esterase trace Negative       Assessment/Plan:       1. Acute cystitis without hematuria  POCT Urinalysis    URINE CULTURE(NEW)    cefdinir (OMNICEF) 300 MG Cap        After assessment does appear the patient has an acute UTI.  UA in office was slightly skewed due to patient taking over-the-counter AZO.  There was positive indicators of nitrites and leukocytes along with a large amount of blood as well.  Urine will be sent for culture at this time and patient was placed on antibiotics.  Cefdinir was sent to patient pharmacy.  Patient was instructed to take as prescribed and also increase fluid intake at this time.  Patient to monitor for any worsening signs and symptoms of any other concerns patient was instructed to return to urgent care for reevaluation.    Differential diagnosis, natural history, and supportive care  discussed. We also reviewed side effects of medication including allergic response, GI upset, tendon injury, rash, sedation etc. Patient and/or guardian voices understanding.      Advised the patient to follow-up with the primary care physician for recheck, reevaluation, and consideration of further management.    I personally reviewed prior external notes and test results pertinent to today's visit as well as additional imaging and testing completed in clinic today.     Please note that this dictation was created using voice recognition software. I have made every reasonable attempt to correct obvious errors, but I expect that there are errors of grammar and possibly content that I did not discover before finalizing the note.    This note was electronically signed by FEDERICA Ramos

## 2024-11-12 NOTE — LETTER
[facility] [place]  [address]  [place] [address]     November 12, 2024    Patient: Consuelo Nguyen   YOB: 1965   Date of Visit: 11/12/2024       To Whom It May Concern:    Consuelo Nguyen was seen and treated in our department on 11/12/2024. [place] excuse daughter today due Patient's recent illness and need for transportation.     Sincerely,     JEFFERSON Bragg.

## 2024-11-15 LAB
BACTERIA UR CULT: ABNORMAL
BACTERIA UR CULT: ABNORMAL
SIGNIFICANT IND 70042: ABNORMAL
SITE SITE: ABNORMAL
SOURCE SOURCE: ABNORMAL

## 2025-08-31 ENCOUNTER — OFFICE VISIT (OUTPATIENT)
Dept: URGENT CARE | Facility: PHYSICIAN GROUP | Age: 60
End: 2025-08-31
Payer: COMMERCIAL

## 2025-08-31 VITALS
HEIGHT: 65 IN | TEMPERATURE: 96.8 F | WEIGHT: 172 LBS | OXYGEN SATURATION: 95 % | HEART RATE: 86 BPM | RESPIRATION RATE: 20 BRPM | DIASTOLIC BLOOD PRESSURE: 70 MMHG | SYSTOLIC BLOOD PRESSURE: 128 MMHG | BODY MASS INDEX: 28.66 KG/M2

## 2025-08-31 DIAGNOSIS — R31.9 URINARY TRACT INFECTION WITH HEMATURIA, SITE UNSPECIFIED: ICD-10-CM

## 2025-08-31 DIAGNOSIS — N39.0 URINARY TRACT INFECTION WITH HEMATURIA, SITE UNSPECIFIED: ICD-10-CM

## 2025-08-31 DIAGNOSIS — M79.10 MYALGIA: Primary | ICD-10-CM

## 2025-08-31 LAB
APPEARANCE UR: NORMAL
BILIRUB UR STRIP-MCNC: NEGATIVE MG/DL
COLOR UR AUTO: YELLOW
FLUAV RNA SPEC QL NAA+PROBE: NEGATIVE
FLUBV RNA SPEC QL NAA+PROBE: NEGATIVE
GLUCOSE UR STRIP.AUTO-MCNC: NEGATIVE MG/DL
KETONES UR STRIP.AUTO-MCNC: NEGATIVE MG/DL
LEUKOCYTE ESTERASE UR QL STRIP.AUTO: NORMAL
NITRITE UR QL STRIP.AUTO: NEGATIVE
PH UR STRIP.AUTO: 6 [PH] (ref 5–8)
PROT UR QL STRIP: 30 MG/DL
RBC UR QL AUTO: NORMAL
RSV RNA SPEC QL NAA+PROBE: NEGATIVE
S PYO DNA SPEC NAA+PROBE: NOT DETECTED
SARS-COV-2 RNA RESP QL NAA+PROBE: NEGATIVE
SP GR UR STRIP.AUTO: 1.01
UROBILINOGEN UR STRIP-MCNC: 0.2 MG/DL

## 2025-08-31 RX ORDER — CEFDINIR 300 MG/1
300 CAPSULE ORAL EVERY 12 HOURS
Qty: 10 CAPSULE | Refills: 0 | Status: SHIPPED | OUTPATIENT
Start: 2025-08-31 | End: 2025-09-05

## 2025-08-31 RX ORDER — ATORVASTATIN CALCIUM 10 MG/1
TABLET, FILM COATED ORAL
COMMUNITY

## 2025-08-31 RX ORDER — CHOLECALCIFEROL (VITAMIN D3) 10(400)/ML
DROPS ORAL
COMMUNITY

## 2025-08-31 RX ORDER — EMPAGLIFLOZIN 10 MG/1
TABLET, FILM COATED ORAL
COMMUNITY

## 2025-08-31 RX ORDER — GABAPENTIN 100 MG/1
100 CAPSULE ORAL 3 TIMES DAILY
COMMUNITY

## 2025-08-31 RX ORDER — DULAGLUTIDE 0.75 MG/.5ML
INJECTION, SOLUTION SUBCUTANEOUS
COMMUNITY

## 2025-08-31 ASSESSMENT — ENCOUNTER SYMPTOMS
DIZZINESS: 0
MYALGIAS: 1
SORE THROAT: 0
SHORTNESS OF BREATH: 0
CHILLS: 0
COUGH: 0
NAUSEA: 0
VOMITING: 0
FATIGUE: 1
FEVER: 0
FLANK PAIN: 1